# Patient Record
Sex: MALE | Race: WHITE | NOT HISPANIC OR LATINO | Employment: OTHER | URBAN - METROPOLITAN AREA
[De-identification: names, ages, dates, MRNs, and addresses within clinical notes are randomized per-mention and may not be internally consistent; named-entity substitution may affect disease eponyms.]

---

## 2020-01-15 ENCOUNTER — OFFICE VISIT (OUTPATIENT)
Dept: FAMILY MEDICINE CLINIC | Facility: CLINIC | Age: 65
End: 2020-01-15

## 2020-01-15 VITALS
HEART RATE: 74 BPM | BODY MASS INDEX: 32.99 KG/M2 | DIASTOLIC BLOOD PRESSURE: 88 MMHG | HEIGHT: 65 IN | WEIGHT: 198 LBS | OXYGEN SATURATION: 99 % | TEMPERATURE: 98.6 F | SYSTOLIC BLOOD PRESSURE: 130 MMHG

## 2020-01-15 DIAGNOSIS — I10 ESSENTIAL HYPERTENSION: Primary | ICD-10-CM

## 2020-01-15 DIAGNOSIS — E78.5 HYPERLIPIDEMIA, UNSPECIFIED HYPERLIPIDEMIA TYPE: ICD-10-CM

## 2020-01-15 DIAGNOSIS — R53.83 OTHER FATIGUE: ICD-10-CM

## 2020-01-15 DIAGNOSIS — N52.9 ERECTILE DYSFUNCTION, UNSPECIFIED ERECTILE DYSFUNCTION TYPE: ICD-10-CM

## 2020-01-15 PROCEDURE — 99203 OFFICE O/P NEW LOW 30 MIN: CPT | Performed by: FAMILY MEDICINE

## 2020-01-15 RX ORDER — CITALOPRAM 10 MG/1
TABLET ORAL
COMMUNITY
Start: 2019-12-11 | End: 2020-03-17 | Stop reason: SDUPTHER

## 2020-01-15 RX ORDER — LISINOPRIL 40 MG/1
TABLET ORAL
COMMUNITY
Start: 2019-10-25 | End: 2020-10-19 | Stop reason: SDUPTHER

## 2020-01-15 RX ORDER — AMLODIPINE BESYLATE 5 MG/1
TABLET ORAL
COMMUNITY
Start: 2020-01-04 | End: 2020-03-17 | Stop reason: SDUPTHER

## 2020-01-15 RX ORDER — ROSUVASTATIN CALCIUM 5 MG/1
TABLET, COATED ORAL EVERY OTHER DAY
COMMUNITY
Start: 2019-10-25 | End: 2020-10-19 | Stop reason: SDUPTHER

## 2020-01-15 RX ORDER — HYDROCHLOROTHIAZIDE 25 MG/1
TABLET ORAL
COMMUNITY
Start: 2019-12-11 | End: 2020-03-17 | Stop reason: SDUPTHER

## 2020-01-15 RX ORDER — FLUTICASONE PROPIONATE 50 MCG
SPRAY, SUSPENSION (ML) NASAL
COMMUNITY
Start: 2019-11-06 | End: 2020-01-15 | Stop reason: SDUPTHER

## 2020-01-15 RX ORDER — FLUTICASONE PROPIONATE 50 MCG
2 SPRAY, SUSPENSION (ML) NASAL
COMMUNITY
Start: 2017-06-26 | End: 2020-09-21 | Stop reason: SDUPTHER

## 2020-01-15 NOTE — PROGRESS NOTES
Subjective   Elkin Arias is a 64 y.o. male.     Pt is seen by Dr Thomas at [facility].  Pt would like to discuss medications.     Pt complains of keeping belly fat, sexual performance, low energy. Pt is concerned about possible low testosterone.     Erectile Dysfunction   This is a chronic problem. The current episode started more than 1 year ago. The problem has been waxing and waning since onset. The nature of his difficulty is achieving erection and maintaining erection. He reports no anxiety. Irritative symptoms do not include frequency or urgency. Obstructive symptoms do not include incomplete emptying, an intermittent stream, a slower stream, straining or a weak stream. Pertinent negatives include no chills, hematuria or inability to urinate. Nothing aggravates the symptoms. Past treatments include nothing. Risk factors include hypertension.        Pt. States that he would like to have testosterone checked, as he is having issues with ED, hot flashes, night time awakenings, and fatigue. He states that he was on prednisone for PMR for 3 years, however he has been off of the prednisone for 5 months.     Pt. States that he has a lot of arthritis and has had 2 knees replaced. He states that he is having an MRI next week for his right shoulder, which has been particularly painful.    Pt. Had a colonoscopy 2 years ago. Sister  from colon cancer.     Pt. Has had a flu shot.     The following portions of the patient's history were reviewed and updated as appropriate: allergies, current medications, past family history, past medical history, past social history, past surgical history and problem list.    Review of Systems   Constitutional: Negative for chills, fatigue, fever and unexpected weight change.   HENT: Negative for congestion, ear pain, nosebleeds, rhinorrhea, sinus pressure, sneezing, sore throat and trouble swallowing.    Eyes: Negative for itching and visual disturbance.   Respiratory:  "Negative for cough, chest tightness, shortness of breath and wheezing.    Cardiovascular: Negative for chest pain, palpitations and leg swelling.   Gastrointestinal: Negative for abdominal pain, anal bleeding, blood in stool, constipation, diarrhea, nausea and vomiting.   Endocrine: Negative for cold intolerance, heat intolerance, polydipsia and polyuria.   Genitourinary: Negative for difficulty urinating, frequency, hematuria, incomplete emptying and urgency.   Musculoskeletal: Positive for arthralgias, back pain and gait problem. Negative for myalgias.   Skin: Negative for rash and wound.   Neurological: Negative for dizziness, weakness, numbness and headaches.   Hematological: Negative for adenopathy. Does not bruise/bleed easily.   Psychiatric/Behavioral: Negative for agitation, confusion, decreased concentration and suicidal ideas. The patient is not nervous/anxious.        Objective     Vitals:    01/15/20 1337   BP: 130/88   Pulse: 74   Temp: 98.6 °F (37 °C)   SpO2: 99%   Weight: 89.8 kg (198 lb)   Height: 165.1 cm (65\")       Physical Exam   Constitutional: He is oriented to person, place, and time. He appears well-developed and well-nourished.   HENT:   Head: Normocephalic and atraumatic.   Eyes: Pupils are equal, round, and reactive to light. Conjunctivae and EOM are normal. Right eye exhibits no discharge. Left eye exhibits no discharge. No scleral icterus.   Neck: Normal range of motion. Neck supple. No JVD present. No tracheal deviation present. No thyromegaly present.   Cardiovascular: Normal rate, regular rhythm and normal heart sounds. Exam reveals no gallop and no friction rub.   No murmur heard.  Pulmonary/Chest: Effort normal and breath sounds normal. No respiratory distress. He has no wheezes. He has no rales. He exhibits no tenderness.   Abdominal: Soft. Bowel sounds are normal. He exhibits no distension and no mass. There is no tenderness.   Musculoskeletal: Normal range of motion. He exhibits " no edema.   Neurological: He is alert and oriented to person, place, and time. He has normal reflexes. He displays normal reflexes. No cranial nerve deficit. Coordination normal.   Skin: Skin is warm and dry.   Psychiatric: He has a normal mood and affect. His behavior is normal. Judgment and thought content normal.   Nursing note and vitals reviewed.      Assessment/Plan     Problem List Items Addressed This Visit        Cardiovascular and Mediastinum    Essential hypertension - Primary    Relevant Medications    amLODIPine (NORVASC) 5 MG tablet    hydroCHLOROthiazide (HYDRODIURIL) 25 MG tablet    lisinopril (PRINIVIL,ZESTRIL) 40 MG tablet    Hyperlipidemia    Relevant Medications    rosuvastatin (CRESTOR) 5 MG tablet       Other    Other fatigue    Relevant Orders    Hepatitis C Antibody    CBC & Differential    TSH    Comprehensive Metabolic Panel    Hemoglobin A1c    Lipid Panel    Vitamin D 25 Hydroxy    Vitamin B12    POC Urinalysis Dipstick, Automated    Testosterone    Erectile dysfunction    Relevant Orders    Ambulatory Referral to Urology        Pt. Due for shingles vaccination.   Pt. Has had a flu shot this year.  Will send for a copy of prior medical records.  Patient will sign a release as He is leaving the office today.  I will review those upon receipt.

## 2020-01-20 ENCOUNTER — LAB (OUTPATIENT)
Dept: FAMILY MEDICINE CLINIC | Facility: CLINIC | Age: 65
End: 2020-01-20

## 2020-01-20 DIAGNOSIS — R53.83 OTHER FATIGUE: ICD-10-CM

## 2020-01-20 LAB
25(OH)D3 SERPL-MCNC: 34.1 NG/ML (ref 30–100)
ALBUMIN SERPL-MCNC: 4.1 G/DL (ref 3.5–5.2)
ALBUMIN/GLOB SERPL: 1.4 G/DL
ALP SERPL-CCNC: 60 U/L (ref 39–117)
ALT SERPL W P-5'-P-CCNC: 24 U/L (ref 1–41)
ANION GAP SERPL CALCULATED.3IONS-SCNC: 14.8 MMOL/L (ref 5–15)
AST SERPL-CCNC: 19 U/L (ref 1–40)
BASOPHILS # BLD AUTO: 0.08 10*3/MM3 (ref 0–0.2)
BASOPHILS NFR BLD AUTO: 0.9 % (ref 0–1.5)
BILIRUB SERPL-MCNC: 0.4 MG/DL (ref 0.2–1.2)
BUN BLD-MCNC: 18 MG/DL (ref 8–23)
BUN/CREAT SERPL: 21.4 (ref 7–25)
CALCIUM SPEC-SCNC: 9.5 MG/DL (ref 8.6–10.5)
CHLORIDE SERPL-SCNC: 102 MMOL/L (ref 98–107)
CHOLEST SERPL-MCNC: 142 MG/DL (ref 0–200)
CO2 SERPL-SCNC: 25.2 MMOL/L (ref 22–29)
CREAT BLD-MCNC: 0.84 MG/DL (ref 0.76–1.27)
DEPRECATED RDW RBC AUTO: 40.2 FL (ref 37–54)
EOSINOPHIL # BLD AUTO: 0.18 10*3/MM3 (ref 0–0.4)
EOSINOPHIL NFR BLD AUTO: 2.1 % (ref 0.3–6.2)
ERYTHROCYTE [DISTWIDTH] IN BLOOD BY AUTOMATED COUNT: 13.1 % (ref 12.3–15.4)
GFR SERPL CREATININE-BSD FRML MDRD: 92 ML/MIN/1.73
GLOBULIN UR ELPH-MCNC: 2.9 GM/DL
GLUCOSE BLD-MCNC: 99 MG/DL (ref 65–99)
HBA1C MFR BLD: 6.3 % (ref 4.8–5.6)
HCT VFR BLD AUTO: 40.7 % (ref 37.5–51)
HCV AB SER DONR QL: NORMAL
HDLC SERPL-MCNC: 44 MG/DL (ref 40–60)
HGB BLD-MCNC: 13.7 G/DL (ref 13–17.7)
IMM GRANULOCYTES # BLD AUTO: 0.02 10*3/MM3 (ref 0–0.05)
IMM GRANULOCYTES NFR BLD AUTO: 0.2 % (ref 0–0.5)
LDLC SERPL CALC-MCNC: 78 MG/DL (ref 0–100)
LDLC/HDLC SERPL: 1.77 {RATIO}
LYMPHOCYTES # BLD AUTO: 2.46 10*3/MM3 (ref 0.7–3.1)
LYMPHOCYTES NFR BLD AUTO: 28.7 % (ref 19.6–45.3)
MCH RBC QN AUTO: 28.5 PG (ref 26.6–33)
MCHC RBC AUTO-ENTMCNC: 33.7 G/DL (ref 31.5–35.7)
MCV RBC AUTO: 84.6 FL (ref 79–97)
MONOCYTES # BLD AUTO: 0.85 10*3/MM3 (ref 0.1–0.9)
MONOCYTES NFR BLD AUTO: 9.9 % (ref 5–12)
NEUTROPHILS # BLD AUTO: 4.98 10*3/MM3 (ref 1.7–7)
NEUTROPHILS NFR BLD AUTO: 58.2 % (ref 42.7–76)
NRBC BLD AUTO-RTO: 0 /100 WBC (ref 0–0.2)
PLATELET # BLD AUTO: 285 10*3/MM3 (ref 140–450)
PMV BLD AUTO: 11.2 FL (ref 6–12)
POTASSIUM BLD-SCNC: 4.6 MMOL/L (ref 3.5–5.2)
PROT SERPL-MCNC: 7 G/DL (ref 6–8.5)
RBC # BLD AUTO: 4.81 10*6/MM3 (ref 4.14–5.8)
SODIUM BLD-SCNC: 142 MMOL/L (ref 136–145)
TESTOST SERPL-MCNC: 340 NG/DL (ref 193–740)
TRIGL SERPL-MCNC: 101 MG/DL (ref 0–150)
TSH SERPL DL<=0.05 MIU/L-ACNC: 2.78 UIU/ML (ref 0.27–4.2)
VIT B12 BLD-MCNC: 402 PG/ML (ref 211–946)
VLDLC SERPL-MCNC: 20.2 MG/DL (ref 5–40)
WBC NRBC COR # BLD: 8.57 10*3/MM3 (ref 3.4–10.8)

## 2020-01-20 PROCEDURE — 86803 HEPATITIS C AB TEST: CPT | Performed by: FAMILY MEDICINE

## 2020-01-20 PROCEDURE — 80053 COMPREHEN METABOLIC PANEL: CPT | Performed by: FAMILY MEDICINE

## 2020-01-20 PROCEDURE — 85025 COMPLETE CBC W/AUTO DIFF WBC: CPT | Performed by: FAMILY MEDICINE

## 2020-01-20 PROCEDURE — 80061 LIPID PANEL: CPT | Performed by: FAMILY MEDICINE

## 2020-01-20 PROCEDURE — 82306 VITAMIN D 25 HYDROXY: CPT | Performed by: FAMILY MEDICINE

## 2020-01-20 PROCEDURE — 84443 ASSAY THYROID STIM HORMONE: CPT | Performed by: FAMILY MEDICINE

## 2020-01-20 PROCEDURE — 84403 ASSAY OF TOTAL TESTOSTERONE: CPT | Performed by: FAMILY MEDICINE

## 2020-01-20 PROCEDURE — 83036 HEMOGLOBIN GLYCOSYLATED A1C: CPT | Performed by: FAMILY MEDICINE

## 2020-01-20 PROCEDURE — 82607 VITAMIN B-12: CPT | Performed by: FAMILY MEDICINE

## 2020-03-17 ENCOUNTER — TELEPHONE (OUTPATIENT)
Dept: FAMILY MEDICINE CLINIC | Facility: CLINIC | Age: 65
End: 2020-03-17

## 2020-03-17 RX ORDER — AMLODIPINE BESYLATE 5 MG/1
5 TABLET ORAL DAILY
Qty: 90 TABLET | Refills: 0 | Status: SHIPPED | OUTPATIENT
Start: 2020-03-17 | End: 2020-07-15

## 2020-03-17 RX ORDER — CITALOPRAM 10 MG/1
10 TABLET ORAL DAILY
Qty: 90 TABLET | Refills: 0 | Status: SHIPPED | OUTPATIENT
Start: 2020-03-17 | End: 2020-06-10

## 2020-03-17 RX ORDER — HYDROCHLOROTHIAZIDE 25 MG/1
25 TABLET ORAL DAILY
Qty: 90 TABLET | Refills: 1 | Status: SHIPPED | OUTPATIENT
Start: 2020-03-17 | End: 2020-09-04

## 2020-04-06 ENCOUNTER — TELEMEDICINE (OUTPATIENT)
Dept: FAMILY MEDICINE CLINIC | Facility: TELEHEALTH | Age: 65
End: 2020-04-06

## 2020-04-06 DIAGNOSIS — H92.03 EARACHE SYMPTOMS, BILATERAL: Primary | ICD-10-CM

## 2020-04-06 PROCEDURE — 99213 OFFICE O/P EST LOW 20 MIN: CPT | Performed by: NURSE PRACTITIONER

## 2020-04-06 RX ORDER — LORATADINE 10 MG/1
10 TABLET ORAL DAILY
Qty: 30 TABLET | Refills: 2 | Status: SHIPPED | OUTPATIENT
Start: 2020-04-06 | End: 2020-09-21 | Stop reason: SDUPTHER

## 2020-04-06 RX ORDER — AMOXICILLIN AND CLAVULANATE POTASSIUM 875; 125 MG/1; MG/1
1 TABLET, FILM COATED ORAL 2 TIMES DAILY
Qty: 14 TABLET | Refills: 0 | Status: SHIPPED | OUTPATIENT
Start: 2020-04-06 | End: 2020-04-13

## 2020-04-06 NOTE — PATIENT INSTRUCTIONS
-Continue Flonase and Claritin  -Take medications as prescribed  -If symptoms worsen to do not improve, follow up with primary doctor or visit nearest urgent care      Otitis Media, Adult    Otitis media occurs when there is inflammation and fluid in the middle ear. Your middle ear is a part of the ear that contains bones for hearing as well as air that helps send sounds to your brain.  What are the causes?  This condition is caused by a blockage in the eustachian tube. This tube drains fluid from the ear to the back of the nose (nasopharynx). A blockage in this tube can be caused by an object or by swelling (edema) in the tube. Problems that can cause a blockage include:  · A cold or other upper respiratory infection.  · Allergies.  · An irritant, such as tobacco smoke.  · Enlarged adenoids. The adenoids are areas of soft tissue located high in the back of the throat, behind the nose and the roof of the mouth.  · A mass in the nasopharynx.  · Damage to the ear caused by pressure changes (barotrauma).  What are the signs or symptoms?  Symptoms of this condition include:  · Ear pain.  · A fever.  · Decreased hearing.  · A headache.  · Tiredness (lethargy).  · Fluid leaking from the ear.  · Ringing in the ear.  How is this diagnosed?  This condition is diagnosed with a physical exam. During the exam your health care provider will use an instrument called an otoscope to look into your ear and check for redness, swelling, and fluid. He or she will also ask about your symptoms.  Your health care provider may also order tests, such as:  · A test to check the movement of the eardrum (pneumatic otoscopy). This test is done by squeezing a small amount of air into the ear.  · A test that changes air pressure in the middle ear to check how well the eardrum moves and whether the eustachian tube is working (tympanogram).  How is this treated?  This condition usually goes away on its own within 3-5 days. But if the condition is  caused by a bacteria infection and does not go away own its own, or keeps coming back, your health care provider may:  · Prescribe antibiotic medicines to treat the infection.  · Prescribe or recommend medicines to control pain.  Follow these instructions at home:  · Take over-the-counter and prescription medicines only as told by your health care provider.  · If you were prescribed an antibiotic medicine, take it as told by your health care provider. Do not stop taking the antibiotic even if you start to feel better.  · Keep all follow-up visits as told by your health care provider. This is important.  Contact a health care provider if:  · You have bleeding from your nose.  · There is a lump on your neck.  · You are not getting better in 5 days.  · You feel worse instead of better.  Get help right away if:  · You have severe pain that is not controlled with medicine.  · You have swelling, redness, or pain around your ear.  · You have stiffness in your neck.  · A part of your face is paralyzed.  · The bone behind your ear (mastoid) is tender when you touch it.  · You develop a severe headache.  Summary  · Otitis media is redness, soreness, and swelling of the middle ear.  · This condition usually goes away on its own within 3-5 days.  · If the problem does not go away in 3-5 days, your health care provider may prescribe or recommend medicines to treat your symptoms.  · If you were prescribed an antibiotic medicine, take it as told by your health care provider.  This information is not intended to replace advice given to you by your health care provider. Make sure you discuss any questions you have with your health care provider.  Document Released: 09/22/2005 Document Revised: 12/08/2017 Document Reviewed: 12/08/2017  [employer]vier Interactive Patient Education © 2020 [employer]  Eustachian Tube Dysfunction    Eustachian tube dysfunction refers to a condition in which a blockage develops in the narrow passage that  connects the middle ear to the back of the nose (eustachian tube). The eustachian tube regulates air pressure in the middle ear by letting air move between the ear and nose. It also helps to drain fluid from the middle ear space.  Eustachian tube dysfunction can affect one or both ears. When the eustachian tube does not function properly, air pressure, fluid, or both can build up in the middle ear.  What are the causes?  This condition occurs when the eustachian tube becomes blocked or cannot open normally. Common causes of this condition include:  · Ear infections.  · Colds and other infections that affect the nose, mouth, and throat (upper respiratory tract).  · Allergies.  · Irritation from cigarette smoke.  · Irritation from stomach acid coming up into the esophagus (gastroesophageal reflux). The esophagus is the tube that carries food from the mouth to the stomach.  · Sudden changes in air pressure, such as from descending in an airplane or scuba diving.  · Abnormal growths in the nose or throat, such as:  ? Growths that line the nose (nasal polyps).  ? Abnormal growth of cells (tumors).  ? Enlarged tissue at the back of the throat (adenoids).  What increases the risk?  You are more likely to develop this condition if:  · You smoke.  · You are overweight.  · You are a child who has:  ? Certain birth defects of the mouth, such as cleft palate.  ? Large tonsils or adenoids.  What are the signs or symptoms?  Common symptoms of this condition include:  · A feeling of fullness in the ear.  · Ear pain.  · Clicking or popping noises in the ear.  · Ringing in the ear.  · Hearing loss.  · Loss of balance.  · Dizziness.  Symptoms may get worse when the air pressure around you changes, such as when you travel to an area of high elevation, fly on an airplane, or go scuba diving.  How is this diagnosed?  This condition may be diagnosed based on:  · Your symptoms.  · A physical exam of your ears, nose, and throat.  · Tests,  "such as those that measure:  ? The movement of your eardrum (tympanogram).  ? Your hearing (audiometry).  How is this treated?  Treatment depends on the cause and severity of your condition.  · In mild cases, you may relieve your symptoms by moving air into your ears. This is called \"popping the ears.\"  · In more severe cases, or if you have symptoms of fluid in your ears, treatment may include:  ? Medicines to relieve congestion (decongestants).  ? Medicines that treat allergies (antihistamines).  ? Nasal sprays or ear drops that contain medicines that reduce swelling (steroids).  ? A procedure to drain the fluid in your eardrum (myringotomy). In this procedure, a small tube is placed in the eardrum to:  § Drain the fluid.  § Restore the air in the middle ear space.  ? A procedure to insert a balloon device through the nose to inflate the opening of the eustachian tube (balloon dilation).  Follow these instructions at home:  Lifestyle  · Do not do any of the following until your health care provider approves:  ? Travel to high altitudes.  ? Fly in airplanes.  ? Work in a pressurized cabin or room.  ? Scuba dive.  · Do not use any products that contain nicotine or tobacco, such as cigarettes and e-cigarettes. If you need help quitting, ask your health care provider.  · Keep your ears dry. Wear fitted earplugs during showering and bathing. Dry your ears completely after.  General instructions  · Take over-the-counter and prescription medicines only as told by your health care provider.  · Use techniques to help pop your ears as recommended by your health care provider. These may include:  ? Chewing gum.  ? Yawning.  ? Frequent, forceful swallowing.  ? Closing your mouth, holding your nose closed, and gently blowing as if you are trying to blow air out of your nose.  · Keep all follow-up visits as told by your health care provider. This is important.  Contact a health care provider if:  · Your symptoms do not go away " after treatment.  · Your symptoms come back after treatment.  · You are unable to pop your ears.  · You have:  ? A fever.  ? Pain in your ear.  ? Pain in your head or neck.  ? Fluid draining from your ear.  · Your hearing suddenly changes.  · You become very dizzy.  · You lose your balance.  Summary  · Eustachian tube dysfunction refers to a condition in which a blockage develops in the eustachian tube.  · It can be caused by ear infections, allergies, inhaled irritants, or abnormal growths in the nose or throat.  · Symptoms include ear pain, hearing loss, or ringing in the ears.  · Mild cases are treated with maneuvers to unblock the ears, such as yawning or ear popping.  · Severe cases are treated with medicines. Surgery may also be done (rare).  This information is not intended to replace advice given to you by your health care provider. Make sure you discuss any questions you have with your health care provider.  Document Released: 01/13/2017 Document Revised: 04/09/2019 Document Reviewed: 04/09/2019  [employer] Interactive Patient Education © 2020 [employer]

## 2020-06-10 RX ORDER — CITALOPRAM 10 MG/1
TABLET ORAL
Qty: 90 TABLET | Refills: 0 | Status: SHIPPED | OUTPATIENT
Start: 2020-06-10 | End: 2020-09-04

## 2020-07-15 RX ORDER — AMLODIPINE BESYLATE 5 MG/1
TABLET ORAL
Qty: 30 TABLET | Refills: 0 | Status: SHIPPED | OUTPATIENT
Start: 2020-07-15 | End: 2020-08-11

## 2020-08-11 RX ORDER — AMLODIPINE BESYLATE 5 MG/1
TABLET ORAL
Qty: 30 TABLET | Refills: 11 | Status: SHIPPED | OUTPATIENT
Start: 2020-08-11 | End: 2020-10-19 | Stop reason: SDUPTHER

## 2020-09-04 RX ORDER — CITALOPRAM 10 MG/1
10 TABLET ORAL DAILY
Qty: 30 TABLET | Refills: 0 | Status: SHIPPED | OUTPATIENT
Start: 2020-09-04 | End: 2020-10-05

## 2020-09-04 RX ORDER — HYDROCHLOROTHIAZIDE 25 MG/1
25 TABLET ORAL DAILY
Qty: 30 TABLET | Refills: 0 | Status: SHIPPED | OUTPATIENT
Start: 2020-09-04 | End: 2020-10-19 | Stop reason: SDUPTHER

## 2020-09-18 ENCOUNTER — TELEPHONE (OUTPATIENT)
Dept: FAMILY MEDICINE CLINIC | Facility: CLINIC | Age: 65
End: 2020-09-18

## 2020-09-18 DIAGNOSIS — H92.03 EARACHE SYMPTOMS, BILATERAL: ICD-10-CM

## 2020-09-18 RX ORDER — FLUTICASONE PROPIONATE 50 MCG
2 SPRAY, SUSPENSION (ML) NASAL DAILY
Qty: 16 G | Refills: 2 | Status: CANCELLED | OUTPATIENT
Start: 2020-09-18

## 2020-09-18 RX ORDER — LORATADINE 10 MG/1
10 TABLET ORAL DAILY
Qty: 30 TABLET | Refills: 2 | Status: CANCELLED | OUTPATIENT
Start: 2020-09-18

## 2020-09-21 DIAGNOSIS — J30.1 SEASONAL ALLERGIC RHINITIS DUE TO POLLEN: Primary | ICD-10-CM

## 2020-09-21 DIAGNOSIS — H92.03 EARACHE SYMPTOMS, BILATERAL: ICD-10-CM

## 2020-09-21 RX ORDER — FLUTICASONE PROPIONATE 50 MCG
2 SPRAY, SUSPENSION (ML) NASAL DAILY
Qty: 18.2 ML | Refills: 11 | Status: SHIPPED | OUTPATIENT
Start: 2020-09-21 | End: 2020-10-19 | Stop reason: SDUPTHER

## 2020-09-21 RX ORDER — LORATADINE 10 MG/1
10 TABLET ORAL DAILY
Qty: 90 TABLET | Refills: 3 | Status: SHIPPED | OUTPATIENT
Start: 2020-09-21 | End: 2020-10-19 | Stop reason: SDUPTHER

## 2020-10-05 RX ORDER — CITALOPRAM 10 MG/1
TABLET ORAL
Qty: 30 TABLET | Refills: 11 | Status: SHIPPED | OUTPATIENT
Start: 2020-10-05 | End: 2020-10-19 | Stop reason: SDUPTHER

## 2020-10-19 ENCOUNTER — OFFICE VISIT (OUTPATIENT)
Dept: FAMILY MEDICINE CLINIC | Facility: CLINIC | Age: 65
End: 2020-10-19

## 2020-10-19 VITALS
BODY MASS INDEX: 33.85 KG/M2 | WEIGHT: 203.2 LBS | OXYGEN SATURATION: 98 % | TEMPERATURE: 98.7 F | DIASTOLIC BLOOD PRESSURE: 82 MMHG | HEIGHT: 65 IN | HEART RATE: 72 BPM | SYSTOLIC BLOOD PRESSURE: 144 MMHG

## 2020-10-19 DIAGNOSIS — F41.9 ANXIETY: ICD-10-CM

## 2020-10-19 DIAGNOSIS — Z23 NEEDS FLU SHOT: ICD-10-CM

## 2020-10-19 DIAGNOSIS — Z00.00 ENCOUNTER FOR MEDICARE ANNUAL WELLNESS EXAM: Primary | ICD-10-CM

## 2020-10-19 DIAGNOSIS — I10 ESSENTIAL HYPERTENSION: ICD-10-CM

## 2020-10-19 DIAGNOSIS — R73.09 ELEVATED HEMOGLOBIN A1C MEASUREMENT: ICD-10-CM

## 2020-10-19 DIAGNOSIS — H92.03 EARACHE SYMPTOMS, BILATERAL: ICD-10-CM

## 2020-10-19 DIAGNOSIS — J30.1 SEASONAL ALLERGIC RHINITIS DUE TO POLLEN: ICD-10-CM

## 2020-10-19 LAB
EXPIRATION DATE: NORMAL
HBA1C MFR BLD: 5.7 %
Lab: NORMAL

## 2020-10-19 PROCEDURE — G0008 ADMIN INFLUENZA VIRUS VAC: HCPCS | Performed by: FAMILY MEDICINE

## 2020-10-19 PROCEDURE — 90686 IIV4 VACC NO PRSV 0.5 ML IM: CPT | Performed by: FAMILY MEDICINE

## 2020-10-19 PROCEDURE — 83036 HEMOGLOBIN GLYCOSYLATED A1C: CPT | Performed by: FAMILY MEDICINE

## 2020-10-19 PROCEDURE — G0439 PPPS, SUBSEQ VISIT: HCPCS | Performed by: FAMILY MEDICINE

## 2020-10-19 RX ORDER — LISINOPRIL 40 MG/1
40 TABLET ORAL DAILY
Qty: 90 TABLET | Refills: 3 | Status: SHIPPED | OUTPATIENT
Start: 2020-10-19 | End: 2021-10-18

## 2020-10-19 RX ORDER — HYDROCHLOROTHIAZIDE 25 MG/1
25 TABLET ORAL DAILY
Qty: 90 TABLET | Refills: 3 | Status: SHIPPED | OUTPATIENT
Start: 2020-10-19 | End: 2021-10-18

## 2020-10-19 RX ORDER — ROSUVASTATIN CALCIUM 5 MG/1
5 TABLET, COATED ORAL DAILY
Qty: 90 TABLET | Refills: 3 | Status: SHIPPED | OUTPATIENT
Start: 2020-10-19 | End: 2021-10-27 | Stop reason: SDUPTHER

## 2020-10-19 RX ORDER — FLUTICASONE PROPIONATE 50 MCG
2 SPRAY, SUSPENSION (ML) NASAL DAILY
Qty: 18.2 ML | Refills: 11 | Status: SHIPPED | OUTPATIENT
Start: 2020-10-19 | End: 2021-07-26 | Stop reason: SDUPTHER

## 2020-10-19 RX ORDER — AMLODIPINE BESYLATE 5 MG/1
5 TABLET ORAL DAILY
Qty: 90 TABLET | Refills: 3 | Status: SHIPPED | OUTPATIENT
Start: 2020-10-19 | End: 2021-10-18

## 2020-10-19 RX ORDER — CITALOPRAM 10 MG/1
10 TABLET ORAL DAILY
Qty: 90 TABLET | Refills: 3 | Status: SHIPPED | OUTPATIENT
Start: 2020-10-19 | End: 2020-11-03 | Stop reason: SDUPTHER

## 2020-10-19 RX ORDER — LORATADINE 10 MG/1
10 TABLET ORAL DAILY
Qty: 90 TABLET | Refills: 3 | Status: SHIPPED | OUTPATIENT
Start: 2020-10-19 | End: 2021-07-26

## 2020-10-19 NOTE — PROGRESS NOTES
The ABCs of the Annual Wellness Visit  Subsequent Medicare Wellness Visit    Chief Complaint   Patient presents with   • Welcome To Medicare     changed pharmacy and needs a list of meds faxed       Subjective   History of Present Illness:  Elkin Arias is a 64 y.o. male who presents for a Subsequent Medicare Wellness Visit.    HEALTH RISK ASSESSMENT    Recent Hospitalizations:  No hospitalization(s) within the last year.    Current Medical Providers:  Patient Care Team:  Sho Kim MD as PCP - General (Family Medicine)    Smoking Status:  Social History     Tobacco Use   Smoking Status Former Smoker   Smokeless Tobacco Never Used   Tobacco Comment    some in the 70s       Alcohol Consumption:  Social History     Substance and Sexual Activity   Alcohol Use Yes    Comment: 1 beer a month on average       Depression Screen:   PHQ-2/PHQ-9 Depression Screening 10/19/2020   Little interest or pleasure in doing things 0   Feeling down, depressed, or hopeless 0   Total Score 0       Fall Risk Screen:  TIGREADI Fall Risk Assessment has not been completed.    Health Habits and Functional and Cognitive Screening:  Functional & Cognitive Status 10/19/2020   Do you have difficulty preparing food and eating? No   Do you have difficulty bathing yourself, getting dressed or grooming yourself? No   Do you have difficulty using the toilet? No   Do you have difficulty moving around from place to place? No   Do you have trouble with steps or getting out of a bed or a chair? No   Current Diet Well Balanced Diet   Dental Exam Up to date   Eye Exam Up to date   Exercise (times per week) 7 times per week   Current Exercise Activities Include Cardiovasular Workout on Exercise Equipment   Do you need help using the phone?  No   Are you deaf or do you have serious difficulty hearing?  No   Do you need help with transportation? No   Do you need help shopping? No   Do you need help preparing meals?  No   Do you need help with housework?  No    Do you need help with laundry? No   Do you need help taking your medications? No   Do you need help managing money? No   Do you ever drive or ride in a car without wearing a seat belt? Yes   Have you felt unusual stress, anger or loneliness in the last month? No   Who do you live with? Spouse   If you need help, do you have trouble finding someone available to you? No   Have you been bothered in the last four weeks by sexual problems? No   Do you have difficulty concentrating, remembering or making decisions? No         Does the patient have evidence of cognitive impairment? No    Asprin use counseling:Taking ASA appropriately as indicated    Age-appropriate Screening Schedule:  Refer to the list below for future screening recommendations based on patient's age, sex and/or medical conditions. Orders for these recommended tests are listed in the plan section. The patient has been provided with a written plan.    Health Maintenance   Topic Date Due   • ZOSTER VACCINE (2 of 3) 01/23/2021 (Originally 11/22/2016)   • LIPID PANEL  01/20/2021   • COLONOSCOPY  01/16/2023   • TDAP/TD VACCINES (3 - Td) 01/01/2028   • INFLUENZA VACCINE  Completed          The following portions of the patient's history were reviewed and updated as appropriate: allergies, current medications, past family history, past medical history, past social history, past surgical history and problem list.    Outpatient Medications Prior to Visit   Medication Sig Dispense Refill   • amLODIPine (NORVASC) 5 MG tablet TAKE 1 TABLET DAILY (WILL NEED APPOINTMENT FOR FURTHER REFILLS) 30 tablet 11   • citalopram (CeleXA) 10 MG tablet TAKE 1 TABLET DAILY (MUST MAKE APPOINTMENT FOR FURTHER REFILLS) 30 tablet 11   • fluticasone (FLONASE) 50 MCG/ACT nasal spray 2 sprays into the nostril(s) as directed by provider Daily. 18.2 mL 11   • hydroCHLOROthiazide (HYDRODIURIL) 25 MG tablet Take 1 tablet by mouth Daily. (MUST MAKE APPT FOR FURTHER REFILLS) 30 tablet 0   •  lisinopril (PRINIVIL,ZESTRIL) 40 MG tablet      • loratadine (Claritin) 10 MG tablet Take 1 tablet by mouth Daily. 90 tablet 3   • rosuvastatin (CRESTOR) 5 MG tablet Every Other Day.       No facility-administered medications prior to visit.        Patient Active Problem List   Diagnosis   • Essential hypertension   • Hyperlipidemia   • Other fatigue   • Erectile dysfunction       Advanced Care Planning:  ACP discussion was held with the patient during this visit. Patient does not have an advance directive, information provided.    Review of Systems   Constitutional: Negative for chills, fatigue, fever and unexpected weight change.   HENT: Negative for congestion, ear pain, nosebleeds, rhinorrhea, sinus pressure, sneezing, sore throat and trouble swallowing.    Eyes: Negative for itching and visual disturbance.   Respiratory: Negative for cough, chest tightness, shortness of breath and wheezing.    Cardiovascular: Negative for chest pain, palpitations and leg swelling.   Gastrointestinal: Negative for abdominal pain, anal bleeding, blood in stool, constipation, diarrhea, nausea and vomiting.   Endocrine: Negative for cold intolerance, heat intolerance, polydipsia and polyuria.   Genitourinary: Negative for difficulty urinating, frequency, hematuria and urgency.   Musculoskeletal: Negative for back pain, gait problem and myalgias.   Skin: Negative for rash and wound.   Neurological: Negative for dizziness, weakness, numbness and headaches.   Hematological: Negative for adenopathy. Does not bruise/bleed easily.   Psychiatric/Behavioral: Negative for agitation, confusion, decreased concentration and suicidal ideas. The patient is not nervous/anxious.        Compared to one year ago, the patient feels his physical health is better.  Compared to one year ago, the patient feels his mental health is better.    Reviewed chart for potential of high risk medication in the elderly: yes  Reviewed chart for potential of harmful  "drug interactions in the elderly:yes    Objective         Vitals:    10/19/20 1418   BP: 144/82   Pulse: 72   Temp: 98.7 °F (37.1 °C)   SpO2: 98%   Weight: 92.2 kg (203 lb 3.2 oz)   Height: 165.1 cm (65\")   PainSc: 0-No pain       Body mass index is 33.81 kg/m².  Discussed the patient's BMI with him. The BMI is above average; no BMI management plan is appropriate..    Physical Exam  Vitals signs and nursing note reviewed.   Constitutional:       Appearance: He is well-developed.   HENT:      Head: Normocephalic and atraumatic.   Eyes:      General: No scleral icterus.        Right eye: No discharge.         Left eye: No discharge.      Conjunctiva/sclera: Conjunctivae normal.      Pupils: Pupils are equal, round, and reactive to light.   Neck:      Musculoskeletal: Normal range of motion and neck supple.      Thyroid: No thyromegaly.      Vascular: No JVD.      Trachea: No tracheal deviation.   Cardiovascular:      Rate and Rhythm: Normal rate and regular rhythm.      Heart sounds: Normal heart sounds. No murmur. No friction rub. No gallop.    Pulmonary:      Effort: Pulmonary effort is normal. No respiratory distress.      Breath sounds: Normal breath sounds. No wheezing or rales.   Chest:      Chest wall: No tenderness.   Abdominal:      General: Bowel sounds are normal. There is no distension.      Palpations: Abdomen is soft. There is no mass.      Tenderness: There is no abdominal tenderness.   Musculoskeletal: Normal range of motion.   Skin:     General: Skin is warm and dry.   Neurological:      Mental Status: He is alert and oriented to person, place, and time.      Cranial Nerves: No cranial nerve deficit.      Coordination: Coordination normal.      Deep Tendon Reflexes: Reflexes are normal and symmetric. Reflexes normal.   Psychiatric:         Behavior: Behavior normal.         Thought Content: Thought content normal.         Judgment: Judgment normal.         Lab Results   Component Value Date    HGBA1C " 5.7 10/19/2020        Assessment/Plan   Medicare Risks and Personalized Health Plan  CMS Preventative Services Quick Reference  Advance Directive Discussion  Cardiovascular risk  Chronic Pain   Colon Cancer Screening  Depression/Dysphoria  Diabetic Lab Screening   Fall Risk  Obesity/Overweight   Polypharmacy    The above risks/problems have been discussed with the patient.  Pertinent information has been shared with the patient in the After Visit Summary.  Follow up plans and orders are seen below in the Assessment/Plan Section.    Diagnoses and all orders for this visit:    1. Encounter for Medicare annual wellness exam (Primary)  -     rosuvastatin (CRESTOR) 5 MG tablet; Take 1 tablet by mouth Daily.  Dispense: 90 tablet; Refill: 3    2. Seasonal allergic rhinitis due to pollen  -     fluticasone (FLONASE) 50 MCG/ACT nasal spray; 2 sprays into the nostril(s) as directed by provider Daily.  Dispense: 18.2 mL; Refill: 11    3. Earache symptoms, bilateral  -     loratadine (Claritin) 10 MG tablet; Take 1 tablet by mouth Daily.  Dispense: 90 tablet; Refill: 3    4. Essential hypertension  -     amLODIPine (NORVASC) 5 MG tablet; Take 1 tablet by mouth Daily.  Dispense: 90 tablet; Refill: 3  -     hydroCHLOROthiazide (HYDRODIURIL) 25 MG tablet; Take 1 tablet by mouth Daily.  Dispense: 90 tablet; Refill: 3  -     lisinopril (PRINIVIL,ZESTRIL) 40 MG tablet; Take 1 tablet by mouth Daily.  Dispense: 90 tablet; Refill: 3    5. Anxiety  -     citalopram (CeleXA) 10 MG tablet; Take 1 tablet by mouth Daily.  Dispense: 90 tablet; Refill: 3    6. Elevated hemoglobin A1c measurement  -     POC Glycosylated Hemoglobin (Hb A1C)    7. Needs flu shot  -     Fluarix Quad >6 Months ([id number])      Follow Up:  Return in about 6 months (around 4/19/2021) for Recheck.     An After Visit Summary and PPPS were given to the patient.

## 2020-10-19 NOTE — PATIENT INSTRUCTIONS
Advance Directive    Advance directives are legal documents that let you make choices ahead of time about your health care and medical treatment in case you become unable to communicate for yourself. Advance directives are a way for you to make known your wishes to family, friends, and health care providers. This can let others know about your end-of-life care if you become unable to communicate.  Discussing and writing advance directives should happen over time rather than all at once. Advance directives can be changed depending on your situation and what you want, even after you have signed the advance directives.  There are different types of advance directives, such as:  · Medical power of .  · Living will.  · Do not resuscitate (DNR) or do not attempt resuscitation (DNAR) order.  Health care proxy and medical power of   A health care proxy is also called a health care agent. This is a person who is appointed to make medical decisions for you in cases where you are unable to make the decisions yourself. Generally, people choose someone they know well and trust to represent their preferences. Make sure to ask this person for an agreement to act as your proxy. A proxy may have to exercise judgment in the event of a medical decision for which your wishes are not known.  A medical power of  is a legal document that names your health care proxy. Depending on the laws in your state, after the document is written, it may also need to be:  · Signed.  · Notarized.  · Dated.  · Copied.  · Witnessed.  · Incorporated into your medical record.  You may also want to appoint someone to manage your money in a situation in which you are unable to do so. This is called a durable power of  for finances. It is a separate legal document from the durable power of  for health care. You may choose the same person or someone different from your health care proxy to act as your agent in money  matters.  If you do not appoint a proxy, or if there is a concern that the proxy is not acting in your best interests, a court may appoint a guardian to act on your behalf.  Living will  A living will is a set of instructions that state your wishes about medical care when you cannot express them yourself. Health care providers should keep a copy of your living will in your medical record. You may want to give a copy to family members or friends. To alert caregivers in case of an emergency, you can place a card in your wallet to let them know that you have a living will and where they can find it. A living will is used if you become:  · Terminally ill.  · Disabled.  · Unable to communicate or make decisions.  Items to consider in your living will include:  · To use or not to use life-support equipment, such as dialysis machines and breathing machines (ventilators).  · A DNR or DNAR order. This tells health care providers not to use cardiopulmonary resuscitation (CPR) if breathing or heartbeat stops.  · To use or not to use tube feeding.  · To be given or not to be given food and fluids.  · Comfort (palliative) care when the goal becomes comfort rather than a cure.  · Donation of organs and tissues.  A living will does not give instructions for distributing your money and property if you should pass away.  DNR or DNAR  A DNR or DNAR order is a request not to have CPR in the event that your heart stops beating or you stop breathing. If a DNR or DNAR order has not been made and shared, a health care provider will try to help any patient whose heart has stopped or who has stopped breathing. If you plan to have surgery, talk with your health care provider about how your DNR or DNAR order will be followed if problems occur.  What if I do not have an advance directive?  If you do not have an advance directive, some states assign family decision makers to act on your behalf based on how closely you are related to them. Each  state has its own laws about advance directives. You may want to check with your health care provider, , or state representative about the laws in your state.  Summary  · Advance directives are the legal documents that allow you to make choices ahead of time about your health care and medical treatment in case you become unable to tell others about your care.  · The process of discussing and writing advance directives should happen over time. You can change the advance directives, even after you have signed them.  · Advance directives include DNR or DNAR orders, living guerrero, and designating an agent as your medical power of .  This information is not intended to replace advice given to you by your health care provider. Make sure you discuss any questions you have with your health care provider.  Document Released: 2009 Document Revised: 2020 Document Reviewed: 2020  [employer] Patient Education ©  [employer]    Medicare Wellness  Personal Prevention Plan of Service     Date of Office Visit:  10/19/2020  Encounter Provider:  Sho Kim MD  Place of Service:  [facility]  Patient Name: Elkin Arias  :  1955    As part of the Medicare Wellness portion of your visit today, we are providing you with this personalized preventive plan of services (PPPS). This plan is based upon recommendations of the [place] Preventive Services Task Force (USPSTF) and the Advisory Committee on Immunization Practices (ACIP).    This lists the preventive care services that should be considered, and provides dates of when you are due. Items listed as completed are up-to-date and do not require any further intervention.    Health Maintenance   Topic Date Due   • ANNUAL WELLNESS VISIT  01/15/2020   • INFLUENZA VACCINE  2020   • ZOSTER VACCINE (2 of 3) 2021 (Originally 2016)   • LIPID PANEL  2021   • COLONOSCOPY  2023   •  TDAP/TD VACCINES (3 - Td) 01/01/2028   • HEPATITIS C SCREENING  Completed   • Pneumococcal Vaccine 0-64  Aged Out       No orders of the defined types were placed in this encounter.      No follow-ups on file.

## 2020-10-28 ENCOUNTER — TELEPHONE (OUTPATIENT)
Dept: FAMILY MEDICINE CLINIC | Facility: CLINIC | Age: 65
End: 2020-10-28

## 2020-10-28 NOTE — TELEPHONE ENCOUNTER
PT CALLED STATING THAT MEDICARE QUESTIONED citalopram (CeleXA) 10 MG tablet.  PT ASKED IF DR BLOUNT STILL WANTED HIM TO TAKE IT.  PT STATED THAT THEY WILL NOT APPROVE THE MEDICATION UNTIL THE PROVIDER APPROVES THIS.    PT CONTACT [phone]     PLEASE ADVISE    PHARMACY VERIFIED [facility] - [place], [place] - [address] - [phone]  - [phone] FX

## 2020-11-03 DIAGNOSIS — F41.9 ANXIETY: Primary | ICD-10-CM

## 2020-11-03 RX ORDER — CITALOPRAM 10 MG/1
10 TABLET ORAL DAILY
Qty: 90 TABLET | Refills: 3 | Status: SHIPPED | OUTPATIENT
Start: 2020-11-03 | End: 2021-10-18

## 2020-12-04 ENCOUNTER — TELEPHONE (OUTPATIENT)
Dept: FAMILY MEDICINE CLINIC | Facility: CLINIC | Age: 65
End: 2020-12-04

## 2020-12-04 NOTE — TELEPHONE ENCOUNTER
PT CALLED STATED THAT HE HAS A TOOTH ACH, WAS GIVEN ANTIBIOTIC YESTERDAY clindamycin RX, STATED  THAT HE HAS SORENESS BELOW EYES AND WOULD LIKE TO KNOW WHAT DR SUGGEST, ALSO GUMS ARE SORE.    PLEASE ADVISE.  CALL BACK:[phone]      [facility] [place] [place] [address]

## 2020-12-04 NOTE — TELEPHONE ENCOUNTER
Called patient regarding his appointment. He was started on the clindamycin yesterday afternoon. He has not been on it a 24 hours yet.   He denies fever. If any facial swelling it is minimal.   He has been taking ibuprofen for pain with some relief.   He stated he took a 3 hour nap and is feeling better.   Discussed following up tomorrow if worsening. He is agreeable. Discussed taking a photo every 12 hours to monitor for facial swelling.     He is agreeable to f/u in the morning.   BERTIN Palacios

## 2020-12-26 ENCOUNTER — TELEPHONE (OUTPATIENT)
Dept: FAMILY MEDICINE CLINIC | Facility: CLINIC | Age: 65
End: 2020-12-26

## 2020-12-26 NOTE — TELEPHONE ENCOUNTER
----- Message from Keyana Cottrell MA sent at 12/26/2020  9:37 AM EST -----  Regarding: FW: Test Results Question  Contact: [phone]  On call message  ----- Message -----  From: Elkin Arias  Sent: 12/24/2020  10:07 PM EST  To: [facility]  Subject: Test Results Question                            I have a question about SCANNED - LABS resulted on 12/21/20. I can't read them    I spoke to the patient on the telephone and told her that all of his labs were normal that were reported to us.  Apparently there were some antibody levels that he was expecting that were not included in these results.  He will contact ACL next week in that regard.

## 2020-12-30 ENCOUNTER — TELEMEDICINE (OUTPATIENT)
Dept: FAMILY MEDICINE CLINIC | Facility: CLINIC | Age: 65
End: 2020-12-30

## 2020-12-30 DIAGNOSIS — U07.1 COVID-19: ICD-10-CM

## 2020-12-30 DIAGNOSIS — J01.00 ACUTE MAXILLARY SINUSITIS, RECURRENCE NOT SPECIFIED: Primary | ICD-10-CM

## 2020-12-30 PROBLEM — M35.3 PMR (POLYMYALGIA RHEUMATICA) (HCC): Status: ACTIVE | Noted: 2020-12-30

## 2020-12-30 PROCEDURE — 99213 OFFICE O/P EST LOW 20 MIN: CPT | Performed by: NURSE PRACTITIONER

## 2020-12-30 RX ORDER — AZITHROMYCIN 250 MG/1
TABLET, FILM COATED ORAL
Qty: 6 TABLET | Refills: 0 | Status: SHIPPED | OUTPATIENT
Start: 2020-12-30 | End: 2021-07-26

## 2020-12-30 RX ORDER — PREDNISONE 1 MG/1
TABLET ORAL
COMMUNITY
Start: 2020-11-26 | End: 2021-07-26

## 2020-12-30 NOTE — PROGRESS NOTES
Linda Arias is a 65 y.o. male.     This was an audio and video enabled telemedicine encounter.      History of Present Illness Complains of sinus symptoms for 3 weeks. Had dental pain and was treated with Cleocin for probably abscess. He did complete one week of the Cleocin that started on 12/3/20.   He realized it was not a dental problem and felt like more of a sinus infection. The office felt like the Cleocin would be effective for sinus infection.  Currently complains of pressure around eyes. Has ear sensitivity. Scant post nasal drainage is light green. No fever. Extended family members with covid. Had a rapid test for Covid last week.12/26/20 that was positive.  Has a slight cough. No sore throat. No sob, chest pain. No loss of taste or smell.   He is taking zinc.   On steroid for autoimmune disorder.    Outpatient Encounter Medications as of 12/30/2020   Medication Sig Dispense Refill   • amLODIPine (NORVASC) 5 MG tablet Take 1 tablet by mouth Daily. 90 tablet 3   • citalopram (CeleXA) 10 MG tablet Take 1 tablet by mouth Daily. 90 tablet 3   • fluticasone (FLONASE) 50 MCG/ACT nasal spray 2 sprays into the nostril(s) as directed by provider Daily. 18.2 mL 11   • hydroCHLOROthiazide (HYDRODIURIL) 25 MG tablet Take 1 tablet by mouth Daily. 90 tablet 3   • lisinopril (PRINIVIL,ZESTRIL) 40 MG tablet Take 1 tablet by mouth Daily. 90 tablet 3   • loratadine (Claritin) 10 MG tablet Take 1 tablet by mouth Daily. 90 tablet 3   • rosuvastatin (CRESTOR) 5 MG tablet Take 1 tablet by mouth Daily. 90 tablet 3     No facility-administered encounter medications on file as of 12/30/2020.        The following portions of the patient's history were reviewed and updated as appropriate: allergies, current medications, past family history, past medical history, past social history, past surgical history and problem list.    Review of Systems   Constitutional: Negative for appetite change, fever and unexpected weight  loss.   HENT: Positive for sinus pressure. Negative for nosebleeds, sore throat and trouble swallowing.    Eyes: Negative for visual disturbance.   Respiratory: Positive for cough. Negative for shortness of breath and wheezing.    Cardiovascular: Negative for chest pain, palpitations and leg swelling.   Gastrointestinal: Negative for abdominal pain, blood in stool, constipation, diarrhea, nausea and vomiting.   Endocrine: Negative for polydipsia, polyphagia and polyuria.   Genitourinary: Negative for dysuria, frequency, hematuria and urinary incontinence.   Musculoskeletal: Negative for arthralgias, gait problem, joint swelling and myalgias.   Skin: Negative for rash.   Neurological: Negative for dizziness, seizures, syncope and numbness.   Hematological: Negative for adenopathy. Does not bruise/bleed easily.   Psychiatric/Behavioral: Negative for sleep disturbance and depressed mood. The patient is not nervous/anxious.        PHQ-2 Depression Screening  Little interest or pleasure in doing things?  0   Feeling down, depressed, or hopeless?  0   PHQ-2 Total Score  0        Objective     There were no vitals taken for this visit.    Physical Exam  Constitutional:       General: He is not in acute distress.     Appearance: Normal appearance. He is well-developed.   HENT:      Head: Normocephalic and atraumatic.      Nose: Nose normal.   Eyes:      General:         Right eye: No discharge.         Left eye: No discharge.      Conjunctiva/sclera: Conjunctivae normal.   Pulmonary:      Effort: Pulmonary effort is normal. No respiratory distress.   Skin:     Findings: No rash.   Neurological:      General: No focal deficit present.      Mental Status: He is alert and oriented to person, place, and time. Mental status is at baseline.   Psychiatric:         Mood and Affect: Mood normal.         Behavior: Behavior normal.         Thought Content: Thought content normal.         Judgment: Judgment normal.            Assessment/Plan   Diagnoses and all orders for this visit:    1. Acute maxillary sinusitis, recurrence not specified (Primary)  -     azithromycin (Zithromax Z-Marko) 250 MG tablet; Take 2 tablets by mouth on day 1, then 1 tablet daily on days 2-5  Dispense: 6 tablet; Refill: 0    2. COVID-19  -     azithromycin (Zithromax Z-Marko) 250 MG tablet; Take 2 tablets by mouth on day 1, then 1 tablet daily on days 2-5  Dispense: 6 tablet; Refill: 0  -     QUESTIONNAIRE SERIES    Increase fluids, zinc, vit c and vit D. Reviewed warning signs of covid resp distress and when to seek emergency medical care. May need albuterol if become sob or has wheezing. Patient verbalized understanding.  Follow up as needed.  This was an audio and video enabled telemedicine encounter.

## 2021-01-07 ENCOUNTER — TELEPHONE (OUTPATIENT)
Dept: FAMILY MEDICINE CLINIC | Facility: CLINIC | Age: 66
End: 2021-01-07

## 2021-01-07 DIAGNOSIS — J01.00 ACUTE MAXILLARY SINUSITIS, RECURRENCE NOT SPECIFIED: Primary | ICD-10-CM

## 2021-01-07 RX ORDER — AMOXICILLIN AND CLAVULANATE POTASSIUM 875; 125 MG/1; MG/1
1 TABLET, FILM COATED ORAL 2 TIMES DAILY
Qty: 20 TABLET | Refills: 0 | Status: SHIPPED | OUTPATIENT
Start: 2021-01-07 | End: 2021-07-26

## 2021-01-07 NOTE — TELEPHONE ENCOUNTER
Will send in Augmentin. If still symptomatic will need to see ENT. Cont Flonase, zyrtec and mucinex.

## 2021-01-07 NOTE — TELEPHONE ENCOUNTER
Caller: Elkin Arias    Relationship: Self    Best call back number: [phone]     What medication are you requesting: Z-Marko    What are your current symptoms: Sinus Infection     How long have you been experiencing symptoms: N/A     Have you had these symptoms before:    [x] Yes  [] No    Have you been treated for these symptoms before:   [x] Yes  [] No    If a prescription is needed, what is your preferred pharmacy and phone number: [facility] [address] [facility], [place] - [address] - [phone] [facility] [phone] FX     Additional notes: Patient had an appointment on 12/30/2020 and was prescribed a z-marko for a sinus infection. He states the medication worked but now that he is done with the medication he is still having symptoms.

## 2021-04-23 ENCOUNTER — TELEPHONE (OUTPATIENT)
Dept: FAMILY MEDICINE CLINIC | Facility: CLINIC | Age: 66
End: 2021-04-23

## 2021-07-14 ENCOUNTER — TELEPHONE (OUTPATIENT)
Dept: FAMILY MEDICINE CLINIC | Facility: CLINIC | Age: 66
End: 2021-07-14

## 2021-07-14 NOTE — TELEPHONE ENCOUNTER
Caller: Elkin Arias    Relationship to patient: Self    Best call back number: [phone]    Chief complaint: FEELS LIKE HE HAS FLUID IN HIS EARS AFTER A SINUS INFECTION    Type of visit: OFFICE    Requested date: 07/20 AFTER 11:30AM

## 2021-07-26 ENCOUNTER — OFFICE VISIT (OUTPATIENT)
Dept: FAMILY MEDICINE CLINIC | Facility: CLINIC | Age: 66
End: 2021-07-26

## 2021-07-26 ENCOUNTER — TELEPHONE (OUTPATIENT)
Dept: FAMILY MEDICINE CLINIC | Facility: CLINIC | Age: 66
End: 2021-07-26

## 2021-07-26 VITALS
TEMPERATURE: 97 F | OXYGEN SATURATION: 98 % | WEIGHT: 205 LBS | RESPIRATION RATE: 15 BRPM | BODY MASS INDEX: 34.11 KG/M2 | SYSTOLIC BLOOD PRESSURE: 150 MMHG | HEART RATE: 70 BPM | DIASTOLIC BLOOD PRESSURE: 92 MMHG

## 2021-07-26 DIAGNOSIS — Z87.891 PERSONAL HISTORY OF TOBACCO USE, PRESENTING HAZARDS TO HEALTH: ICD-10-CM

## 2021-07-26 DIAGNOSIS — E78.5 HYPERLIPIDEMIA, UNSPECIFIED HYPERLIPIDEMIA TYPE: Chronic | ICD-10-CM

## 2021-07-26 DIAGNOSIS — J30.1 SEASONAL ALLERGIC RHINITIS DUE TO POLLEN: ICD-10-CM

## 2021-07-26 DIAGNOSIS — I10 ESSENTIAL HYPERTENSION: Primary | Chronic | ICD-10-CM

## 2021-07-26 DIAGNOSIS — Z13.6 SCREENING FOR AAA (ABDOMINAL AORTIC ANEURYSM): ICD-10-CM

## 2021-07-26 DIAGNOSIS — Z23 NEED FOR 23-POLYVALENT PNEUMOCOCCAL POLYSACCHARIDE VACCINE: ICD-10-CM

## 2021-07-26 DIAGNOSIS — H69.83 ETD (EUSTACHIAN TUBE DYSFUNCTION), BILATERAL: Chronic | ICD-10-CM

## 2021-07-26 PROBLEM — H69.93 ETD (EUSTACHIAN TUBE DYSFUNCTION), BILATERAL: Status: ACTIVE | Noted: 2021-07-26

## 2021-07-26 PROBLEM — H69.93 ETD (EUSTACHIAN TUBE DYSFUNCTION), BILATERAL: Chronic | Status: ACTIVE | Noted: 2021-07-26

## 2021-07-26 PROCEDURE — 99213 OFFICE O/P EST LOW 20 MIN: CPT | Performed by: FAMILY MEDICINE

## 2021-07-26 RX ORDER — FLUTICASONE PROPIONATE 50 MCG
2 SPRAY, SUSPENSION (ML) NASAL DAILY
Qty: 18.2 ML | Refills: 11 | Status: SHIPPED | OUTPATIENT
Start: 2021-07-26 | End: 2021-10-27 | Stop reason: SDUPTHER

## 2021-07-26 RX ORDER — MONTELUKAST SODIUM 10 MG/1
1 TABLET ORAL DAILY
COMMUNITY
Start: 2021-07-20 | End: 2021-10-27 | Stop reason: SDUPTHER

## 2021-07-26 NOTE — TELEPHONE ENCOUNTER
I do not understand this message.  Is he worried he will have reaction to ct contrast dye.  The AAA screen is an ultrasound.  No prep or dye.

## 2021-07-26 NOTE — TELEPHONE ENCOUNTER
Caller: Elkin Arias    Relationship to patient: Self    Best call back number: [phone]     What is the call regarding:  PATIENT STATES THAT HE IS HAVING A TEST RUN.  HE MIGHT BE ALLERGIC TO SHRIMP BECAUSE THE TWO TIMES THAT HE HAS EATEN IT MAKE HIM SICK TO HIS STOMACH.  HE HAS NOT BEEN TESTED, THOUGH.  HIS DAUGHTER SAID THAT HE MIGHT NOT SHOULD TAKE THE PREP NEEDED OR THE DYE.

## 2021-07-26 NOTE — PROGRESS NOTES
Linda Arias is a 65 y.o. male.     History of Present Illness had appt at [facility] in [place].  Was given antibiotic and sx have improved.  He was seen last week.  He was rx singulair. He was given another nasal spray.  He has seen ENT and has future surgery scheduled.      Requesting pna 23 vaccine.      Plans to contact ENT.     The following portions of the patient's history were reviewed and updated as appropriate: allergies, current medications, past family history, past medical history, past social history, past surgical history and problem list.    Review of Systems   Constitutional: Negative for chills, fatigue, fever and unexpected weight change.   HENT: Negative for congestion, ear pain, nosebleeds, rhinorrhea, sinus pressure, sneezing, sore throat and trouble swallowing.    Eyes: Negative for itching and visual disturbance.   Respiratory: Negative for cough, chest tightness, shortness of breath and wheezing.    Cardiovascular: Negative for chest pain, palpitations and leg swelling.   Gastrointestinal: Negative for abdominal pain, anal bleeding, blood in stool, constipation, diarrhea, nausea and vomiting.   Endocrine: Negative for cold intolerance, heat intolerance, polydipsia and polyuria.   Genitourinary: Negative for difficulty urinating, frequency, hematuria and urgency.   Musculoskeletal: Negative for back pain, gait problem and myalgias.   Skin: Negative for rash and wound.   Neurological: Negative for dizziness, weakness, numbness and headaches.   Hematological: Negative for adenopathy. Does not bruise/bleed easily.   Psychiatric/Behavioral: Negative for agitation, confusion, decreased concentration and suicidal ideas. The patient is not nervous/anxious.        Objective     Vitals:    07/26/21 1318   BP: 150/92   Pulse: 70   Resp: 15   Temp: 97 °F (36.1 °C)   SpO2: 98%   Weight: 93 kg (205 lb)       Physical Exam  Vitals and nursing note reviewed.   Constitutional:       Appearance: He is  well-developed.   HENT:      Head: Normocephalic and atraumatic.   Eyes:      General: No scleral icterus.        Right eye: No discharge.         Left eye: No discharge.      Conjunctiva/sclera: Conjunctivae normal.      Pupils: Pupils are equal, round, and reactive to light.   Neck:      Thyroid: No thyromegaly.      Vascular: No JVD.      Trachea: No tracheal deviation.   Cardiovascular:      Rate and Rhythm: Normal rate and regular rhythm.      Heart sounds: Normal heart sounds. No murmur heard.   No friction rub. No gallop.    Pulmonary:      Effort: Pulmonary effort is normal. No respiratory distress.      Breath sounds: Normal breath sounds. No wheezing or rales.   Chest:      Chest wall: No tenderness.   Abdominal:      General: Bowel sounds are normal. There is no distension.      Palpations: Abdomen is soft. There is no mass.      Tenderness: There is no abdominal tenderness.   Musculoskeletal:         General: Normal range of motion.      Cervical back: Normal range of motion and neck supple.   Skin:     General: Skin is warm and dry.   Neurological:      Mental Status: He is alert and oriented to person, place, and time.      Cranial Nerves: No cranial nerve deficit.      Coordination: Coordination normal.      Deep Tendon Reflexes: Reflexes are normal and symmetric. Reflexes normal.   Psychiatric:         Behavior: Behavior normal.         Thought Content: Thought content normal.         Judgment: Judgment normal.         Assessment/Plan     Problem List Items Addressed This Visit        Cardiac and Vasculature    Essential hypertension - Primary (Chronic)    Hyperlipidemia (Chronic)       ENT    ETD (Eustachian tube dysfunction), bilateral (Chronic)      Other Visit Diagnoses     Seasonal allergic rhinitis due to pollen        Relevant Medications    fluticasone (FLONASE) 50 MCG/ACT nasal spray    Need for 23-polyvalent pneumococcal polysaccharide vaccine        Relevant Medications    pneumococcal  polysaccharide 23-valent (PNEUMOVAX-23) vaccine 0.5 mL    Personal history of tobacco use, presenting hazards to health        Relevant Orders    [place] aaa screen limited    Screening for AAA (abdominal aortic aneurysm)        Relevant Orders    [place] aaa screen limited

## 2021-08-10 ENCOUNTER — HOSPITAL ENCOUNTER (OUTPATIENT)
Dept: ULTRASOUND IMAGING | Facility: HOSPITAL | Age: 66
Discharge: HOME | End: 2021-08-10
Admitting: FAMILY MEDICINE

## 2021-08-10 DIAGNOSIS — Z87.891 PERSONAL HISTORY OF TOBACCO USE, PRESENTING HAZARDS TO HEALTH: ICD-10-CM

## 2021-08-10 DIAGNOSIS — Z13.6 SCREENING FOR AAA (ABDOMINAL AORTIC ANEURYSM): ICD-10-CM

## 2021-08-10 PROCEDURE — 76706 US ABDL AORTA SCREEN AAA: CPT

## 2021-10-16 DIAGNOSIS — F41.9 ANXIETY: ICD-10-CM

## 2021-10-16 DIAGNOSIS — I10 ESSENTIAL HYPERTENSION: ICD-10-CM

## 2021-10-18 RX ORDER — LISINOPRIL 40 MG/1
TABLET ORAL
Qty: 90 TABLET | Refills: 3 | Status: SHIPPED | OUTPATIENT
Start: 2021-10-18 | End: 2021-10-27 | Stop reason: SDUPTHER

## 2021-10-18 RX ORDER — HYDROCHLOROTHIAZIDE 25 MG/1
TABLET ORAL
Qty: 90 TABLET | Refills: 3 | Status: SHIPPED | OUTPATIENT
Start: 2021-10-18 | End: 2021-10-27 | Stop reason: SDUPTHER

## 2021-10-18 RX ORDER — CITALOPRAM 10 MG/1
TABLET ORAL
Qty: 90 TABLET | Refills: 3 | Status: SHIPPED | OUTPATIENT
Start: 2021-10-18 | End: 2021-10-27 | Stop reason: SDUPTHER

## 2021-10-18 RX ORDER — AMLODIPINE BESYLATE 5 MG/1
TABLET ORAL
Qty: 90 TABLET | Refills: 3 | Status: SHIPPED | OUTPATIENT
Start: 2021-10-18 | End: 2021-10-27 | Stop reason: SDUPTHER

## 2021-10-25 ENCOUNTER — TELEPHONE (OUTPATIENT)
Dept: FAMILY MEDICINE CLINIC | Facility: CLINIC | Age: 66
End: 2021-10-25

## 2021-10-25 NOTE — TELEPHONE ENCOUNTER
Caller: Elkin Arias    Relationship to patient: Self    Best call back number: [phone]    Chief complaint:     Type of visit: SUBSEQUENT MEDICARE WELLNESS    Requested date: NEXT AVAILABLE    If rescheduling, when is the original appointment: APPOINTMENT WAS TODAY PATIENT THOUGHT HE CANCELLED IT     Additional notes: I WAS UNABLE TO RESCHEDULE AND WAS UNABLE TO WARM TRANSFER. PLEASE CALL PATIENT TO RESCHEDULE

## 2021-10-27 ENCOUNTER — OFFICE VISIT (OUTPATIENT)
Dept: FAMILY MEDICINE CLINIC | Facility: CLINIC | Age: 66
End: 2021-10-27

## 2021-10-27 VITALS
HEART RATE: 70 BPM | SYSTOLIC BLOOD PRESSURE: 160 MMHG | TEMPERATURE: 97.7 F | RESPIRATION RATE: 15 BRPM | BODY MASS INDEX: 33.82 KG/M2 | DIASTOLIC BLOOD PRESSURE: 80 MMHG | OXYGEN SATURATION: 97 % | HEIGHT: 65 IN | WEIGHT: 203 LBS

## 2021-10-27 DIAGNOSIS — M35.3 PMR (POLYMYALGIA RHEUMATICA) (HCC): ICD-10-CM

## 2021-10-27 DIAGNOSIS — I10 ESSENTIAL HYPERTENSION: Chronic | ICD-10-CM

## 2021-10-27 DIAGNOSIS — J30.1 SEASONAL ALLERGIC RHINITIS DUE TO POLLEN: Chronic | ICD-10-CM

## 2021-10-27 DIAGNOSIS — F41.9 ANXIETY: ICD-10-CM

## 2021-10-27 DIAGNOSIS — Z23 NEEDS FLU SHOT: ICD-10-CM

## 2021-10-27 DIAGNOSIS — Z00.00 ENCOUNTER FOR MEDICARE ANNUAL WELLNESS EXAM: Primary | ICD-10-CM

## 2021-10-27 DIAGNOSIS — E78.5 HYPERLIPIDEMIA, UNSPECIFIED HYPERLIPIDEMIA TYPE: ICD-10-CM

## 2021-10-27 PROCEDURE — 1159F MED LIST DOCD IN RCRD: CPT | Performed by: FAMILY MEDICINE

## 2021-10-27 PROCEDURE — 80061 LIPID PANEL: CPT | Performed by: FAMILY MEDICINE

## 2021-10-27 PROCEDURE — 80053 COMPREHEN METABOLIC PANEL: CPT | Performed by: FAMILY MEDICINE

## 2021-10-27 PROCEDURE — G0008 ADMIN INFLUENZA VIRUS VAC: HCPCS | Performed by: FAMILY MEDICINE

## 2021-10-27 PROCEDURE — 85025 COMPLETE CBC W/AUTO DIFF WBC: CPT | Performed by: FAMILY MEDICINE

## 2021-10-27 PROCEDURE — 1126F AMNT PAIN NOTED NONE PRSNT: CPT | Performed by: FAMILY MEDICINE

## 2021-10-27 PROCEDURE — 90662 IIV NO PRSV INCREASED AG IM: CPT | Performed by: FAMILY MEDICINE

## 2021-10-27 PROCEDURE — G0439 PPPS, SUBSEQ VISIT: HCPCS | Performed by: FAMILY MEDICINE

## 2021-10-27 RX ORDER — ROSUVASTATIN CALCIUM 5 MG/1
5 TABLET, COATED ORAL DAILY
Qty: 90 TABLET | Refills: 3 | Status: SHIPPED | OUTPATIENT
Start: 2021-10-27

## 2021-10-27 RX ORDER — FLUTICASONE PROPIONATE 50 MCG
2 SPRAY, SUSPENSION (ML) NASAL DAILY
Qty: 18.2 ML | Refills: 11 | Status: SHIPPED | OUTPATIENT
Start: 2021-10-27

## 2021-10-27 RX ORDER — MONTELUKAST SODIUM 10 MG/1
10 TABLET ORAL DAILY
Qty: 90 TABLET | Refills: 3 | Status: SHIPPED | OUTPATIENT
Start: 2021-10-27

## 2021-10-27 RX ORDER — AMLODIPINE BESYLATE 5 MG/1
5 TABLET ORAL DAILY
Qty: 90 TABLET | Refills: 3 | Status: SHIPPED | OUTPATIENT
Start: 2021-10-27 | End: 2023-02-06

## 2021-10-27 RX ORDER — HYDROCHLOROTHIAZIDE 25 MG/1
25 TABLET ORAL DAILY
Qty: 90 TABLET | Refills: 3 | Status: SHIPPED | OUTPATIENT
Start: 2021-10-27 | End: 2023-01-23

## 2021-10-27 RX ORDER — LISINOPRIL 40 MG/1
40 TABLET ORAL DAILY
Qty: 90 TABLET | Refills: 3 | Status: SHIPPED | OUTPATIENT
Start: 2021-10-27 | End: 2023-01-23

## 2021-10-27 RX ORDER — CITALOPRAM 10 MG/1
10 TABLET ORAL DAILY
Qty: 90 TABLET | Refills: 3 | Status: SHIPPED | OUTPATIENT
Start: 2021-10-27 | End: 2023-01-23

## 2021-10-27 NOTE — PROGRESS NOTES
The ABCs of the Annual Wellness Visit  Subsequent Medicare Wellness Visit    Chief Complaint   Patient presents with   • Medicare Wellness-subsequent      Subjective    History of Present Illness:  Elkin Arias is a 66 y.o. male who presents for a Subsequent Medicare Wellness Visit.    The following portions of the patient's history were reviewed and   updated as appropriate: allergies, current medications, past family history, past medical history, past social history, past surgical history and problem list.    Compared to one year ago, the patient feels his physical   health is worse. He is seeing rheum PMR has appt to go back and see rheum.     Compared to one year ago, the patient feels his mental   health is better.    Recent Hospitalizations:  He was not admitted to the hospital during the last year.       Current Medical Providers:  Patient Care Team:  Sho Kim MD as PCP - General (Family Medicine)    Outpatient Medications Prior to Visit   Medication Sig Dispense Refill   • amLODIPine (NORVASC) 5 MG tablet TAKE 1 TABLET DAILY 90 tablet 3   • citalopram (CeleXA) 10 MG tablet TAKE 1 TABLET DAILY 90 tablet 3   • fluticasone (FLONASE) 50 MCG/ACT nasal spray 2 sprays into the nostril(s) as directed by provider Daily. 18.2 mL 11   • hydroCHLOROthiazide (HYDRODIURIL) 25 MG tablet TAKE 1 TABLET DAILY 90 tablet 3   • lisinopril (PRINIVIL,ZESTRIL) 40 MG tablet TAKE 1 TABLET DAILY 90 tablet 3   • montelukast (SINGULAIR) 10 MG tablet Take 1 tablet by mouth Daily.     • rosuvastatin (CRESTOR) 5 MG tablet Take 1 tablet by mouth Daily. 90 tablet 3     No facility-administered medications prior to visit.       No opioid medication identified on active medication list. I have reviewed chart for other potential  high risk medication/s and harmful drug interactions in the elderly.          Aspirin is not on active medication list.  Aspirin use is indicated based on review of current medical condition/s. Pros and cons  "of this therapy have been discussed with this patient. Benefits of this medication outweigh potential harm.  Patient has been instructed to start taking this medication..    Patient Active Problem List   Diagnosis   • Essential hypertension   • Hyperlipidemia   • Other fatigue   • Erectile dysfunction   • Anxiety   • PMR (polymyalgia rheumatica) (HCC)   • ETD (Eustachian tube dysfunction), bilateral   • Need for 23-polyvalent pneumococcal polysaccharide vaccine   • Seasonal allergic rhinitis due to pollen   • Screening for AAA (abdominal aortic aneurysm)   • Personal history of tobacco use, presenting hazards to health   • Needs flu shot   • Encounter for Medicare annual wellness exam     Advance Care Planning  Advance Directive is not on file.  ACP discussion was held with the patient during this visit. Patient has an advance directive (not in EMR), copy requested.          Objective    Vitals:    10/27/21 0955   BP: 160/80   Pulse: 70   Resp: 15   Temp: 97.7 °F (36.5 °C)   SpO2: 97%   Weight: 92.1 kg (203 lb)   Height: 165.1 cm (65\")   PainSc: 0-No pain     BMI Readings from Last 1 Encounters:   10/27/21 33.78 kg/m²   BMI is above normal parameters. Recommendations include: exercise counseling    Does the patient have evidence of cognitive impairment? No    Physical Exam            HEALTH RISK ASSESSMENT    Smoking Status:  Social History     Tobacco Use   Smoking Status Former Smoker   Smokeless Tobacco Never Used   Tobacco Comment    some in the 70s     Alcohol Consumption:  Social History     Substance and Sexual Activity   Alcohol Use Yes    Comment: 1 beer a month on average     Fall Risk Screen:    ARJUN Fall Risk Assessment was completed, and patient is at LOW risk for falls.Assessment completed on:10/27/2021    Depression Screening:  PHQ-2/PHQ-9 Depression Screening 10/27/2021   Little interest or pleasure in doing things 0   Feeling down, depressed, or hopeless 0   Total Score 0       Health Habits and " Functional and Cognitive Screening:  Functional & Cognitive Status 10/27/2021   Do you have difficulty preparing food and eating? No   Do you have difficulty bathing yourself, getting dressed or grooming yourself? No   Do you have difficulty using the toilet? No   Do you have difficulty moving around from place to place? No   Do you have trouble with steps or getting out of a bed or a chair? No   Current Diet Well Balanced Diet   Dental Exam Up to date   Eye Exam Up to date   Exercise (times per week) 7 times per week   Current Exercises Include Cardiovascular Workout;Stair Step Machine   Current Exercise Activities Include -   Do you need help using the phone?  No   Are you deaf or do you have serious difficulty hearing?  Yes   Do you need help with transportation? No   Do you need help shopping? No   Do you need help preparing meals?  No   Do you need help with housework?  No   Do you need help with laundry? No   Do you need help taking your medications? No   Do you need help managing money? No   Do you ever drive or ride in a car without wearing a seat belt? No   Have you felt unusual stress, anger or loneliness in the last month? -   Who do you live with? -   If you need help, do you have trouble finding someone available to you? -   Have you been bothered in the last four weeks by sexual problems? -   Do you have difficulty concentrating, remembering or making decisions? -       Age-appropriate Screening Schedule:  Refer to the list below for future screening recommendations based on patient's age, sex and/or medical conditions. Orders for these recommended tests are listed in the plan section. The patient has been provided with a written plan.    Health Maintenance   Topic Date Due   • LIPID PANEL  01/20/2021   • INFLUENZA VACCINE  08/01/2021   • ZOSTER VACCINE (2 of 3) 11/05/2022 (Originally 11/22/2016)   • TDAP/TD VACCINES (4 - Td or Tdap) 03/04/2029              Assessment/Plan   CMS Preventative Services  Quick Reference  Risk Factors Identified During Encounter  Alcohol Misuse  Cardiovascular Disease  Immunizations Discussed/Encouraged (specific Immunizations; Influenza  The above risks/problems have been discussed with the patient.  Follow up actions/plans if indicated are seen below in the Assessment/Plan Section.  Pertinent information has been shared with the patient in the After Visit Summary.    Diagnoses and all orders for this visit:    1. Essential hypertension (Primary)  -     amLODIPine (NORVASC) 5 MG tablet; Take 1 tablet by mouth Daily.  Dispense: 90 tablet; Refill: 3  -     hydroCHLOROthiazide (HYDRODIURIL) 25 MG tablet; Take 1 tablet by mouth Daily.  Dispense: 90 tablet; Refill: 3  -     lisinopril (PRINIVIL,ZESTRIL) 40 MG tablet; Take 1 tablet by mouth Daily.  Dispense: 90 tablet; Refill: 3    2. Hyperlipidemia, unspecified hyperlipidemia type    3. PMR (polymyalgia rheumatica) (HCC)    4. Seasonal allergic rhinitis due to pollen  -     fluticasone (FLONASE) 50 MCG/ACT nasal spray; 2 sprays into the nostril(s) as directed by provider Daily.  Dispense: 18.2 mL; Refill: 11    5. Needs flu shot  -     Fluzone High-Dose 65+yrs (7536-3634)    6. Anxiety  -     citalopram (CeleXA) 10 MG tablet; Take 1 tablet by mouth Daily.  Dispense: 90 tablet; Refill: 3    7. Encounter for Medicare annual wellness exam  -     rosuvastatin (CRESTOR) 5 MG tablet; Take 1 tablet by mouth Daily.  Dispense: 90 tablet; Refill: 3    Other orders  -     montelukast (SINGULAIR) 10 MG tablet; Take 1 tablet by mouth Daily.  Dispense: 90 tablet; Refill: 3        Follow Up:   No follow-ups on file.     An After Visit Summary and PPPS were made available to the patient.

## 2021-10-28 LAB
ALBUMIN SERPL-MCNC: 4.6 G/DL (ref 3.5–5.2)
ALBUMIN/GLOB SERPL: 1.5 G/DL
ALP SERPL-CCNC: 72 U/L (ref 39–117)
ALT SERPL W P-5'-P-CCNC: 28 U/L (ref 1–41)
ANION GAP SERPL CALCULATED.3IONS-SCNC: 15.9 MMOL/L (ref 5–15)
AST SERPL-CCNC: 33 U/L (ref 1–40)
BASOPHILS # BLD AUTO: 0.07 10*3/MM3 (ref 0–0.2)
BASOPHILS NFR BLD AUTO: 0.8 % (ref 0–1.5)
BILIRUB SERPL-MCNC: 0.3 MG/DL (ref 0–1.2)
BUN SERPL-MCNC: 18 MG/DL (ref 8–23)
BUN/CREAT SERPL: 19.8 (ref 7–25)
CALCIUM SPEC-SCNC: 9.9 MG/DL (ref 8.6–10.5)
CHLORIDE SERPL-SCNC: 98 MMOL/L (ref 98–107)
CHOLEST SERPL-MCNC: 188 MG/DL (ref 0–200)
CO2 SERPL-SCNC: 23.1 MMOL/L (ref 22–29)
CREAT SERPL-MCNC: 0.91 MG/DL (ref 0.76–1.27)
DEPRECATED RDW RBC AUTO: 42.5 FL (ref 37–54)
EOSINOPHIL # BLD AUTO: 0.23 10*3/MM3 (ref 0–0.4)
EOSINOPHIL NFR BLD AUTO: 2.7 % (ref 0.3–6.2)
ERYTHROCYTE [DISTWIDTH] IN BLOOD BY AUTOMATED COUNT: 14 % (ref 12.3–15.4)
GFR SERPL CREATININE-BSD FRML MDRD: 83 ML/MIN/1.73
GLOBULIN UR ELPH-MCNC: 3 GM/DL
GLUCOSE SERPL-MCNC: 80 MG/DL (ref 65–99)
HCT VFR BLD AUTO: 42.4 % (ref 37.5–51)
HDLC SERPL-MCNC: 42 MG/DL (ref 40–60)
HGB BLD-MCNC: 13.7 G/DL (ref 13–17.7)
IMM GRANULOCYTES # BLD AUTO: 0.03 10*3/MM3 (ref 0–0.05)
IMM GRANULOCYTES NFR BLD AUTO: 0.3 % (ref 0–0.5)
LDLC SERPL CALC-MCNC: 123 MG/DL (ref 0–100)
LDLC/HDLC SERPL: 2.88 {RATIO}
LYMPHOCYTES # BLD AUTO: 2.43 10*3/MM3 (ref 0.7–3.1)
LYMPHOCYTES NFR BLD AUTO: 28.2 % (ref 19.6–45.3)
MCH RBC QN AUTO: 27.1 PG (ref 26.6–33)
MCHC RBC AUTO-ENTMCNC: 32.3 G/DL (ref 31.5–35.7)
MCV RBC AUTO: 83.8 FL (ref 79–97)
MONOCYTES # BLD AUTO: 0.9 10*3/MM3 (ref 0.1–0.9)
MONOCYTES NFR BLD AUTO: 10.5 % (ref 5–12)
NEUTROPHILS NFR BLD AUTO: 4.95 10*3/MM3 (ref 1.7–7)
NEUTROPHILS NFR BLD AUTO: 57.5 % (ref 42.7–76)
NRBC BLD AUTO-RTO: 0 /100 WBC (ref 0–0.2)
PLATELET # BLD AUTO: 312 10*3/MM3 (ref 140–450)
PMV BLD AUTO: 11.8 FL (ref 6–12)
POTASSIUM SERPL-SCNC: 4.4 MMOL/L (ref 3.5–5.2)
PROT SERPL-MCNC: 7.6 G/DL (ref 6–8.5)
RBC # BLD AUTO: 5.06 10*6/MM3 (ref 4.14–5.8)
SODIUM SERPL-SCNC: 137 MMOL/L (ref 136–145)
TRIGL SERPL-MCNC: 125 MG/DL (ref 0–150)
VLDLC SERPL-MCNC: 23 MG/DL (ref 5–40)
WBC # BLD AUTO: 8.61 10*3/MM3 (ref 3.4–10.8)

## 2022-02-25 NOTE — PROGRESS NOTES
Pt left ambulatory with steady gait.      I[place]ory [place], [facility]  02/25/22 5912 Subjective   Chief Complaint   Patient presents with   • Earache     This visit was conducted via video    Elkin Arias is a 64 y.o. male.     Earache    There is pain in both (worse on right) ears. This is a new problem. Episode onset: 2 weeks. The problem has been gradually worsening. There has been no fever. Associated symptoms include hearing loss. Pertinent negatives include no abdominal pain, coughing, diarrhea, ear discharge, headaches, neck pain, rash, rhinorrhea, sore throat or vomiting. Treatments tried: flonase. The treatment provided no relief.        No Known Allergies    Past Medical History:   Diagnosis Date   • Anxiety    • Arthritis    • Hyperlipidemia    • Hypertension    • Polymyalgia rheumatica (CMS/HCC)        Past Surgical History:   Procedure Laterality Date   • KNEE SURGERY     • LIPOMA EXCISION     • TONSILLECTOMY         Social History     Socioeconomic History   • Marital status:      Spouse name: Not on file   • Number of children: Not on file   • Years of education: Not on file   • Highest education level: Not on file   Tobacco Use   • Smoking status: Former Smoker   • Smokeless tobacco: Never Used   • Tobacco comment: some in the 70s   Substance and Sexual Activity   • Alcohol use: Yes     Comment: 1 beer a month on average   • Drug use: Never   • Sexual activity: Defer       Family History   Problem Relation Age of Onset   • Emphysema Mother    • Pancreatic cancer Father    • Colon cancer Sister          Current Outpatient Medications:   •  amLODIPine (NORVASC) 5 MG tablet, Take 1 tablet by mouth Daily., Disp: 90 tablet, Rfl: 0  •  citalopram (CeleXA) 10 MG tablet, Take 1 tablet by mouth Daily., Disp: 90 tablet, Rfl: 0  •  fluticasone (FLONASE) 50 MCG/ACT nasal spray, 2 sprays into the nostril(s) as directed by provider., Disp: , Rfl:   •  hydroCHLOROthiazide (HYDRODIURIL) 25 MG tablet, Take 1 tablet by mouth Daily., Disp: 90 tablet, Rfl: 1  •  lisinopril (PRINIVIL,ZESTRIL) 40 MG  tablet, , Disp: , Rfl:   •  rosuvastatin (CRESTOR) 5 MG tablet, Every Other Day., Disp: , Rfl:   •  amoxicillin-clavulanate (Augmentin) 875-125 MG per tablet, Take 1 tablet by mouth 2 (Two) Times a Day for 7 days., Disp: 14 tablet, Rfl: 0  •  loratadine (Claritin) 10 MG tablet, Take 1 tablet by mouth Daily., Disp: 30 tablet, Rfl: 2      Review of Systems   Constitutional: Negative for chills, diaphoresis, fatigue and fever.   HENT: Positive for ear pain, hearing loss and postnasal drip. Negative for ear discharge, rhinorrhea and sore throat.    Respiratory: Negative for cough.    Gastrointestinal: Negative for abdominal pain, diarrhea and vomiting.   Musculoskeletal: Negative for neck pain.   Skin: Negative for rash.   Neurological: Negative for dizziness and headache.        There were no vitals filed for this visit.    Objective   Physical Exam   Constitutional: He appears well-developed and well-nourished.   Neurological: He is alert.        Procedures     Assessment/Plan   Elkin was seen today for earache.    Diagnoses and all orders for this visit:    Earache symptoms, bilateral  -     amoxicillin-clavulanate (Augmentin) 875-125 MG per tablet; Take 1 tablet by mouth 2 (Two) Times a Day for 7 days.  -     loratadine (Claritin) 10 MG tablet; Take 1 tablet by mouth Daily.        -Continue Flonase and Claritin  -Take medications as prescribed  -If symptoms worsen to do not improve, follow up with primary doctor or visit nearest urgent care      PLAN: Discussed dosing, side effects, recommended other symptomatic care.  Patient should follow up with primary care provider if symptoms worsen, fail to resolve or other symptoms need attention. Patient/family agree to the above.     Jill Beltrán, APRN

## 2023-01-19 DIAGNOSIS — I10 ESSENTIAL HYPERTENSION: Chronic | ICD-10-CM

## 2023-01-19 DIAGNOSIS — F41.9 ANXIETY: ICD-10-CM

## 2023-01-20 NOTE — TELEPHONE ENCOUNTER
Rx Refill Note  Requested Prescriptions     Pending Prescriptions Disp Refills   • lisinopril (PRINIVIL,ZESTRIL) 40 MG tablet [Pharmacy Med Name: LISINOPRIL TAB 40MG] 90 tablet 3     Sig: TAKE 1 TABLET DAILY   • hydroCHLOROthiazide (HYDRODIURIL) 25 MG tablet [Pharmacy Med Name: HYDROCHLOROT TAB 25MG] 90 tablet 3     Sig: TAKE 1 TABLET DAILY   • citalopram (CeleXA) 10 MG tablet [Pharmacy Med Name: CITALOPRAM TAB 10MG] 90 tablet 3     Sig: TAKE 1 TABLET DAILY      Last office visit with prescribing clinician: 10/27/2021   Last telemedicine visit with prescribing clinician: Visit date not found   Next office visit with prescribing clinician: Visit date not found                         Would you like a call back once the refill request has been completed: [] Yes [] No    If the office needs to give you a call back, can they leave a voicemail: [] Yes [] No    Corrine Wallis MA  01/20/23, 08:16 EST

## 2023-01-23 RX ORDER — CITALOPRAM 10 MG/1
TABLET ORAL
Qty: 90 TABLET | Refills: 3 | Status: SHIPPED | OUTPATIENT
Start: 2023-01-23

## 2023-01-23 RX ORDER — HYDROCHLOROTHIAZIDE 25 MG/1
TABLET ORAL
Qty: 90 TABLET | Refills: 3 | Status: SHIPPED | OUTPATIENT
Start: 2023-01-23

## 2023-01-23 RX ORDER — LISINOPRIL 40 MG/1
TABLET ORAL
Qty: 90 TABLET | Refills: 3 | Status: SHIPPED | OUTPATIENT
Start: 2023-01-23

## 2023-02-02 DIAGNOSIS — I10 ESSENTIAL HYPERTENSION: Chronic | ICD-10-CM

## 2023-02-06 RX ORDER — AMLODIPINE BESYLATE 5 MG/1
TABLET ORAL
Qty: 90 TABLET | Refills: 3 | Status: SHIPPED | OUTPATIENT
Start: 2023-02-06

## 2023-09-14 DIAGNOSIS — I10 ESSENTIAL HYPERTENSION: Chronic | ICD-10-CM

## 2023-09-14 RX ORDER — AMLODIPINE BESYLATE 5 MG/1
TABLET ORAL
Qty: 90 TABLET | Refills: 3 | Status: SHIPPED | OUTPATIENT
Start: 2023-09-14

## 2023-09-14 NOTE — TELEPHONE ENCOUNTER
Rx Refill Note  Requested Prescriptions     Pending Prescriptions Disp Refills    amLODIPine (NORVASC) 5 MG tablet [Pharmacy Med Name: AMLODIPINE TAB 5MG] 90 tablet 3     Sig: TAKE 1 TABLET DAILY      Last office visit with prescribing clinician: 10/27/2021   Last telemedicine visit with prescribing clinician: Visit date not found   Next office visit with prescribing clinician: Visit date not found                         Would you like a call back once the refill request has been completed: [] Yes [] No    If the office needs to give you a call back, can they leave a voicemail: [] Yes [] No    Nadira Jacobson LPN  09/14/23, 11:46 EDT

## 2024-09-15 PROBLEM — M19.049 OSTEOARTHRITIS OF FINGER: Status: ACTIVE | Noted: 2024-09-15

## 2024-09-17 ENCOUNTER — TELEPHONE (OUTPATIENT)
Age: 69
End: 2024-09-17
Payer: MEDICARE

## 2024-09-23 PROBLEM — M19.012 PRIMARY OSTEOARTHRITIS OF LEFT SHOULDER: Status: ACTIVE | Noted: 2024-09-23

## 2024-09-23 PROBLEM — M35.3 POLYMYALGIA RHEUMATICA SYNDROME: Status: ACTIVE | Noted: 2024-09-23

## 2024-09-23 PROBLEM — M19.041 PRIMARY OSTEOARTHRITIS OF BOTH HANDS: Status: ACTIVE | Noted: 2024-09-23

## 2024-09-23 PROBLEM — G56.02 CARPAL TUNNEL SYNDROME OF LEFT WRIST: Status: ACTIVE | Noted: 2024-09-23

## 2024-09-23 PROBLEM — M19.042 PRIMARY OSTEOARTHRITIS OF BOTH HANDS: Status: ACTIVE | Noted: 2024-09-23

## 2024-09-25 ENCOUNTER — OFFICE VISIT (OUTPATIENT)
Age: 69
End: 2024-09-25
Payer: MEDICARE

## 2024-09-25 ENCOUNTER — LAB (OUTPATIENT)
Facility: HOSPITAL | Age: 69
End: 2024-09-25
Payer: MEDICARE

## 2024-09-25 VITALS
DIASTOLIC BLOOD PRESSURE: 80 MMHG | HEART RATE: 58 BPM | BODY MASS INDEX: 31.27 KG/M2 | HEIGHT: 65 IN | SYSTOLIC BLOOD PRESSURE: 132 MMHG | WEIGHT: 187.7 LBS | TEMPERATURE: 98 F

## 2024-09-25 DIAGNOSIS — G56.02 CARPAL TUNNEL SYNDROME OF LEFT WRIST: ICD-10-CM

## 2024-09-25 DIAGNOSIS — M19.012 PRIMARY OSTEOARTHRITIS OF LEFT SHOULDER: ICD-10-CM

## 2024-09-25 DIAGNOSIS — M35.3 POLYMYALGIA RHEUMATICA SYNDROME: ICD-10-CM

## 2024-09-25 DIAGNOSIS — M19.041 PRIMARY OSTEOARTHRITIS OF BOTH HANDS: ICD-10-CM

## 2024-09-25 DIAGNOSIS — M19.042 PRIMARY OSTEOARTHRITIS OF BOTH HANDS: ICD-10-CM

## 2024-09-25 DIAGNOSIS — M35.3 POLYMYALGIA RHEUMATICA SYNDROME: Primary | ICD-10-CM

## 2024-09-25 LAB
ALBUMIN SERPL-MCNC: 4.4 G/DL (ref 3.5–5.2)
ALBUMIN/GLOB SERPL: 1.8 G/DL
ALP SERPL-CCNC: 72 U/L (ref 39–117)
ALT SERPL W P-5'-P-CCNC: 27 U/L (ref 1–41)
ANION GAP SERPL CALCULATED.3IONS-SCNC: 8.4 MMOL/L (ref 5–15)
AST SERPL-CCNC: 25 U/L (ref 1–40)
BASOPHILS # BLD AUTO: 0.08 10*3/MM3 (ref 0–0.2)
BASOPHILS NFR BLD AUTO: 1 % (ref 0–1.5)
BILIRUB SERPL-MCNC: 0.6 MG/DL (ref 0–1.2)
BUN SERPL-MCNC: 22 MG/DL (ref 8–23)
BUN/CREAT SERPL: 21.4 (ref 7–25)
CALCIUM SPEC-SCNC: 10 MG/DL (ref 8.6–10.5)
CHLORIDE SERPL-SCNC: 100 MMOL/L (ref 98–107)
CO2 SERPL-SCNC: 27.6 MMOL/L (ref 22–29)
CREAT SERPL-MCNC: 1.03 MG/DL (ref 0.76–1.27)
CRP SERPL-MCNC: 0.62 MG/DL (ref 0–0.5)
DEPRECATED RDW RBC AUTO: 42.5 FL (ref 37–54)
EGFRCR SERPLBLD CKD-EPI 2021: 79.1 ML/MIN/1.73
EOSINOPHIL # BLD AUTO: 0.3 10*3/MM3 (ref 0–0.4)
EOSINOPHIL NFR BLD AUTO: 3.6 % (ref 0.3–6.2)
ERYTHROCYTE [DISTWIDTH] IN BLOOD BY AUTOMATED COUNT: 13.4 % (ref 12.3–15.4)
ERYTHROCYTE [SEDIMENTATION RATE] IN BLOOD: 31 MM/HR (ref 0–20)
GLOBULIN UR ELPH-MCNC: 2.5 GM/DL
GLUCOSE SERPL-MCNC: 114 MG/DL (ref 65–99)
HCT VFR BLD AUTO: 40.7 % (ref 37.5–51)
HGB BLD-MCNC: 13.2 G/DL (ref 13–17.7)
IMM GRANULOCYTES # BLD AUTO: 0.02 10*3/MM3 (ref 0–0.05)
IMM GRANULOCYTES NFR BLD AUTO: 0.2 % (ref 0–0.5)
LYMPHOCYTES # BLD AUTO: 2.34 10*3/MM3 (ref 0.7–3.1)
LYMPHOCYTES NFR BLD AUTO: 27.9 % (ref 19.6–45.3)
MCH RBC QN AUTO: 27.8 PG (ref 26.6–33)
MCHC RBC AUTO-ENTMCNC: 32.4 G/DL (ref 31.5–35.7)
MCV RBC AUTO: 85.7 FL (ref 79–97)
MONOCYTES # BLD AUTO: 0.84 10*3/MM3 (ref 0.1–0.9)
MONOCYTES NFR BLD AUTO: 10 % (ref 5–12)
NEUTROPHILS NFR BLD AUTO: 4.82 10*3/MM3 (ref 1.7–7)
NEUTROPHILS NFR BLD AUTO: 57.3 % (ref 42.7–76)
NRBC BLD AUTO-RTO: 0 /100 WBC (ref 0–0.2)
PLATELET # BLD AUTO: 266 10*3/MM3 (ref 140–450)
PMV BLD AUTO: 11.3 FL (ref 6–12)
POTASSIUM SERPL-SCNC: 4.3 MMOL/L (ref 3.5–5.2)
PROT SERPL-MCNC: 6.9 G/DL (ref 6–8.5)
RBC # BLD AUTO: 4.75 10*6/MM3 (ref 4.14–5.8)
SODIUM SERPL-SCNC: 136 MMOL/L (ref 136–145)
WBC NRBC COR # BLD AUTO: 8.4 10*3/MM3 (ref 3.4–10.8)

## 2024-09-25 PROCEDURE — 36415 COLL VENOUS BLD VENIPUNCTURE: CPT

## 2024-09-25 PROCEDURE — 85025 COMPLETE CBC W/AUTO DIFF WBC: CPT

## 2024-09-25 PROCEDURE — 86140 C-REACTIVE PROTEIN: CPT

## 2024-09-25 PROCEDURE — 85652 RBC SED RATE AUTOMATED: CPT

## 2024-09-25 PROCEDURE — 80053 COMPREHEN METABOLIC PANEL: CPT

## 2024-09-25 RX ORDER — PREDNISONE 5 MG/1
5 TABLET ORAL DAILY
Qty: 90 TABLET | Refills: 1 | Status: SHIPPED | OUTPATIENT
Start: 2024-09-25

## 2025-01-28 NOTE — ASSESSMENT & PLAN NOTE
Considering surgical options. Injections still help.   
RF and CCP negative 11/15  X-rays consistent with OA 11/15, small erosion on L 5th MCP. Hook like osteophytes on 3rd MCP bilaterally..    Stable.  He can use Voltaren gel as needed.  
S/P release with good results.   
Symptoms resolved with 5mg of prednisone.   No GCA symptoms.   Pt global is 2.5/10.    Decrease prednisone to 4 mg.    It could be more degenerative given his known OA.   Get labs today.   Recheck in 2 months.   
none

## 2025-01-28 NOTE — PROGRESS NOTES
Office Follow Up      Date: 01/29/2025   Patient Name: Elkin Arias  MRN: [id number]  YOB: 1955    Referring Physician: No ref. provider found     Chief Complaint: Joint pain      History of Present Illness: Elkin Arias is a 69 y.o. male who is here today for follow up on polymyalgia rheumatica and osteoarthritis.    Prednisone at 5 mg has helped the symptoms completely.   No HA's or visual symptoms. No temporal tenderness.    No joint swelling.  Has OA along with h/o PMR.   Has OA of L shoulder.  Injections help but are becoming less effective.   Ortho wants to do reverse TSR.  Severity level is mild-moderate.  The problem is improving.  Location is shoulder girdle and hip girdle.  The patient describes the discomfort as achy, stiff and tender.  Denies aggravating factors.  Denies relieving factors.  Associated symptoms include fatigue and gelling phenomenon.  Pertinent negatives include fever, headache, jaw claudication, nocturnal pain, rash, scalp tenderness, vision loss and weight loss.      Subjective   Review of Systems: Review of Systems   Constitutional:  Negative for chills, fatigue, fever and unexpected weight loss.   HENT:  Negative for dental problem, mouth sores, sinus pressure and swollen glands.    Eyes:  Negative for photophobia, pain and redness.   Respiratory:  Negative for apnea, cough, chest tightness and shortness of breath.    Cardiovascular:  Negative for chest pain and leg swelling.   Gastrointestinal:  Negative for abdominal pain, diarrhea, nausea and GERD.   Genitourinary:  Negative for dysuria and hematuria.   Skin:  Negative for dry skin, rash, skin lesions and bruise.   Neurological:  Negative for dizziness, seizures, syncope, weakness, headache and memory problem.   Hematological:  Negative for adenopathy. Does not bruise/bleed easily.   Psychiatric/Behavioral:  Negative for sleep disturbance, suicidal ideas, depressed mood and stress. The patient is  "not nervous/anxious.    All other systems reviewed and are negative.       Medications:   Current Outpatient Medications:     amLODIPine (NORVASC) 5 MG tablet, TAKE 1 TABLET DAILY, Disp: 90 tablet, Rfl: 3    aspirin 81 MG EC tablet, Take 1 tablet by mouth Daily., Disp: , Rfl:     citalopram (CeleXA) 10 MG tablet, TAKE 1 TABLET DAILY, Disp: 90 tablet, Rfl: 3    fluticasone (FLONASE) 50 MCG/ACT nasal spray, 2 sprays into the nostril(s) as directed by provider Daily., Disp: 18.2 mL, Rfl: 11    hydroCHLOROthiazide (HYDRODIURIL) 25 MG tablet, TAKE 1 TABLET DAILY, Disp: 90 tablet, Rfl: 3    hydroCHLOROthiazide (MICROZIDE) 12.5 MG capsule, Take 1 capsule by mouth Daily., Disp: , Rfl:     lisinopril (PRINIVIL,ZESTRIL) 40 MG tablet, TAKE 1 TABLET DAILY, Disp: 90 tablet, Rfl: 3    loratadine (Claritin) 10 MG tablet, Take 1 tablet by mouth As Needed., Disp: , Rfl:     losartan-hydrochlorothiazide (HYZAAR) 100-12.5 MG per tablet, Take 1 tablet by mouth Daily., Disp: , Rfl:     metFORMIN (GLUCOPHAGE) 500 MG tablet, Take 1 tablet by mouth 2 (Two) Times a Day With Meals., Disp: , Rfl:     montelukast (SINGULAIR) 10 MG tablet, Take 1 tablet by mouth Daily., Disp: 90 tablet, Rfl: 3    rosuvastatin (CRESTOR) 5 MG tablet, Take 1 tablet by mouth Daily., Disp: 90 tablet, Rfl: 3    predniSONE (DELTASONE) 1 MG tablet, Take 4 tablets by mouth Daily., Disp: 120 tablet, Rfl: 3    Allergies: No Known Allergies    I have reviewed and updated the patient's chief complaint, history of present illness, review of systems, past medical history, surgical history, family history, social history, medications and allergy list as appropriate.     Objective    Vital Signs:   Vitals:    01/29/25 1351   BP: 128/78   Pulse: 70   Temp: 98.1 °F (36.7 °C)   Weight: 89.4 kg (197 lb 1.6 oz)   Height: 165.1 cm (65\")   PainSc:   1   PainLoc: Generalized       Body mass index is 32.8 kg/m².    Physical Exam:  GENERAL: The patient is well developed and well " nourished.  Cooperative and oriented times three.  Affect is normal.  Hydration appears normal.    HEENT: Normocephalic and atraumatic.  No notable alopecia.  No facial rash.  Lids and conjunctiva are normal.  Pupils are equal and sclera are clear.  I see no oral or nasal ulcers.  Lips, teeth, and gums are within normal limits.  Oropharynx is clear. No ropey or tender temporal vessels.  NECK: Neck is supple without adenopathy, masses, or thyromegaly.    LUNGS: Effort is normal.  Lungs are clear without rales or rhonchi.    CARDIOVASCULAR: Normal S1, S2.  No murmurs are heard.   ABDOMEN: Soft and nontender.   EXTREMITIES: There is no edema.    I see no cyanosis or clubbing.    SKIN: Inspection and palpation are normal.  No rashes are present.  No skin lesions.  NEUROLOGIC: Gait is normal.  MUSCULOSKELETAL: Complete joint exam was performed.  There is full range of motion of the shoulders, elbows, wrists, and hands without notable deformities, soft tissue swelling, synovitis, or atrophy. Limited ROM of L shoulder. Shoulder ROM is painful. Osteoarthritic changes are seen in the PIP and DIP joints.  Hips have good flexion and internal and external rotation with pain and stiffness.   Knees have no palpable effusions. Well healed surgical scars are present. There is full extension and full flexion of the knees without pain.  Ankles have no soft tissue swelling, synovitis, or major deformities.    BACK: Straight without notable scoliosis.    Assessment / Plan      Assessment & Plan  Polymyalgia rheumatica syndrome  Symptoms resolved with 5mg of prednisone.   No GCA symptoms.   Pt global is 2.5/10.    Decrease prednisone to 4 mg.    It could be more degenerative given his known OA.   Get labs today.   Recheck in 2 months.   Primary osteoarthritis of both hands  RF and CCP negative 11/15  X-rays consistent with OA 11/15, small erosion on L 5th MCP. Hook like osteophytes on 3rd MCP bilaterally..    Stable.  He can use  Voltaren gel as needed.  Primary osteoarthritis of left shoulder  Considering surgical options. Injections still help.   Carpal tunnel syndrome of left wrist  S/P release with good results.          New Medications Ordered This Visit   Medications    predniSONE (DELTASONE) 1 MG tablet     Sig: Take 4 tablets by mouth Daily.     Dispense:  120 tablet     Refill:  3            Follow Up:   Return in about 2 months (around 3/29/2025).        Hugo Bahena MD  [facility]

## 2025-01-29 ENCOUNTER — OFFICE VISIT (OUTPATIENT)
Age: 70
End: 2025-01-29
Payer: MEDICARE

## 2025-01-29 VITALS
HEART RATE: 70 BPM | BODY MASS INDEX: 32.84 KG/M2 | WEIGHT: 197.1 LBS | HEIGHT: 65 IN | TEMPERATURE: 98.1 F | SYSTOLIC BLOOD PRESSURE: 128 MMHG | DIASTOLIC BLOOD PRESSURE: 78 MMHG

## 2025-01-29 DIAGNOSIS — M19.012 PRIMARY OSTEOARTHRITIS OF LEFT SHOULDER: ICD-10-CM

## 2025-01-29 DIAGNOSIS — M19.041 PRIMARY OSTEOARTHRITIS OF BOTH HANDS: ICD-10-CM

## 2025-01-29 DIAGNOSIS — M35.3 POLYMYALGIA RHEUMATICA SYNDROME: Primary | ICD-10-CM

## 2025-01-29 DIAGNOSIS — G56.02 CARPAL TUNNEL SYNDROME OF LEFT WRIST: ICD-10-CM

## 2025-01-29 DIAGNOSIS — M19.042 PRIMARY OSTEOARTHRITIS OF BOTH HANDS: ICD-10-CM

## 2025-01-29 RX ORDER — PREDNISONE 1 MG/1
4 TABLET ORAL DAILY
Qty: 120 TABLET | Refills: 3 | Status: SHIPPED | OUTPATIENT
Start: 2025-01-29

## 2025-03-07 ENCOUNTER — TELEPHONE (OUTPATIENT)
Age: 70
End: 2025-03-07
Payer: OTHER GOVERNMENT

## 2025-03-07 NOTE — TELEPHONE ENCOUNTER
Patient called regarding prednisone rx. He states he is prescribed 4 mg QD but he decreased to 3 mg QD a couple of weeks ago and is feeling well on this dose. He is wanting to know if it is okay with you to continue to decrease it every couple of weeks or so and go down to 2 mg QD. Please advise.

## 2025-05-20 NOTE — PROGRESS NOTES
Office Follow Up      Date: 05/29/2025   Patient Name: Elkin Arias  MRN: [id number]  YOB: 1955    Referring Physician: No ref. provider found     Chief Complaint: Joint pain      History of Present Illness: Elkin Arias is a 69 y.o. male who is here today for follow up on polymyalgia rheumatica and osteoarthritis.    Weaned off Prednisone completely.   No HA's or visual symptoms. No temporal tenderness.    No joint swelling.  Has OA along with h/o PMR.   Some OA symptoms in the hands.   Has OA of L shoulder.  Injections help but are becoming less effective.   Ortho wants to do reverse TSR.  Pain level is 1/10  EMS is 15 minutes.   Severity level is mild-moderate.  The problem is improving.  Location is shoulder girdle and hip girdle.  The patient describes the discomfort as achy, stiff and tender.  Denies aggravating factors.  Denies relieving factors.  Associated symptoms include fatigue and gelling phenomenon.  Pertinent negatives include fever, headache, jaw claudication, nocturnal pain, rash, scalp tenderness, vision loss and weight loss.      Subjective   Review of Systems: Review of Systems   Constitutional:  Negative for chills, fatigue, fever and unexpected weight loss.   HENT:  Negative for dental problem, mouth sores, sinus pressure and swollen glands.    Eyes:  Negative for photophobia, pain and redness.   Respiratory:  Negative for apnea, cough, chest tightness and shortness of breath.    Cardiovascular:  Negative for chest pain and leg swelling.   Gastrointestinal:  Negative for abdominal pain, diarrhea, nausea and GERD.   Genitourinary:  Negative for dysuria and hematuria.   Musculoskeletal:  Positive for arthralgias and back pain.   Skin:  Negative for dry skin, rash, skin lesions and bruise.   Neurological:  Negative for dizziness, seizures, syncope, weakness, headache and memory problem.   Hematological:  Negative for adenopathy. Does not bruise/bleed easily.  "  Psychiatric/Behavioral:  Negative for sleep disturbance, suicidal ideas, depressed mood and stress. The patient is not nervous/anxious.    All other systems reviewed and are negative.       Medications:   Current Outpatient Medications:     amLODIPine (NORVASC) 5 MG tablet, TAKE 1 TABLET DAILY, Disp: 90 tablet, Rfl: 3    aspirin 81 MG EC tablet, Take 1 tablet by mouth Daily., Disp: , Rfl:     carvedilol (COREG) 6.25 MG tablet, , Disp: , Rfl:     citalopram (CeleXA) 10 MG tablet, TAKE 1 TABLET DAILY, Disp: 90 tablet, Rfl: 3    fluticasone (FLONASE) 50 MCG/ACT nasal spray, 2 sprays into the nostril(s) as directed by provider Daily., Disp: 18.2 mL, Rfl: 11    loratadine (Claritin) 10 MG tablet, Take 1 tablet by mouth As Needed., Disp: , Rfl:     losartan-hydrochlorothiazide (HYZAAR) 100-12.5 MG per tablet, Take 1 tablet by mouth Daily., Disp: , Rfl:     montelukast (SINGULAIR) 10 MG tablet, Take 1 tablet by mouth Daily., Disp: 90 tablet, Rfl: 3    rosuvastatin (CRESTOR) 5 MG tablet, Take 1 tablet by mouth Daily., Disp: 90 tablet, Rfl: 3    Allergies: No Known Allergies    I have reviewed and updated the patient's chief complaint, history of present illness, review of systems, past medical history, surgical history, family history, social history, medications and allergy list as appropriate.     Objective    Vital Signs:   Vitals:    05/29/25 0954   BP: 134/78   Pulse: 67   Temp: 98.1 °F (36.7 °C)   TempSrc: Infrared   Weight: 90 kg (198 lb 8 oz)   Height: 165.1 cm (65\")   PainSc: 3    PainLoc: Hand         Body mass index is 33.03 kg/m².    Physical Exam:  GENERAL: The patient is well developed and well nourished.  Cooperative and oriented times three.  Affect is normal.  Hydration appears normal.    HEENT: Normocephalic and atraumatic.  No notable alopecia.  No facial rash.  Lids and conjunctiva are normal.  Pupils are equal and sclera are clear.  I see no oral or nasal ulcers.  Lips, teeth, and gums are within " normal limits.  Oropharynx is clear. No ropey or tender temporal vessels.  NECK: Neck is supple without adenopathy, masses, or thyromegaly.    LUNGS: Effort is normal.  Lungs are clear without rales or rhonchi.    CARDIOVASCULAR: Normal S1, S2.  No murmurs are heard.   ABDOMEN: Soft and nontender.   EXTREMITIES: There is no edema.    I see no cyanosis or clubbing.    SKIN: Inspection and palpation are normal.  No rashes are present.  No skin lesions.  NEUROLOGIC: Gait is normal.  MUSCULOSKELETAL: Complete joint exam was performed.  There is full range of motion of the shoulders, elbows, wrists, and hands without notable deformities, soft tissue swelling, synovitis, or atrophy. Limited ROM of L shoulder. Shoulder ROM is painful. Osteoarthritic changes are seen in the PIP and DIP joints.  Hips have good flexion and internal and external rotation with pain and stiffness.   Knees have no palpable effusions. Well healed surgical scars are present. There is full extension and full flexion of the knees without pain.  Ankles have no soft tissue swelling, synovitis, or major deformities.    BACK: Straight without notable scoliosis.    Assessment / Plan      Assessment & Plan  Polymyalgia rheumatica syndrome  Symptoms resolved with prednisone.   He has weaned completely and is doing well.   No GCA symptoms.  No visual symptoms, headache, jaw pain, or temporal tenderness.  Pt global is 1/10.    Stay off prednisone for now.   Pain is degenerative given his known OA.   Recheck in 6 months.   Primary osteoarthritis of both hands  RF and CCP negative 11/15  X-rays consistent with OA Hook like osteophytes on 3rd MCP bilaterally..    Stable.  He can use Voltaren gel as needed.  Primary osteoarthritis of left shoulder  Considering surgical options. Injections still help.   Carpal tunnel syndrome of left wrist  S/P release with good results.                      Follow Up:   Return in about 6 months (around 11/29/2025).        Hugo  LAZARA Bahena MD  [facility]

## 2025-05-20 NOTE — ASSESSMENT & PLAN NOTE
Symptoms resolved with prednisone.   He has weaned completely and is doing well.   No GCA symptoms.  No visual symptoms, headache, jaw pain, or temporal tenderness.  Pt global is 1/10.    Stay off prednisone for now.   Pain is degenerative given his known OA.   Recheck in 6 months.

## 2025-05-20 NOTE — ASSESSMENT & PLAN NOTE
RF and CCP negative 11/15  X-rays consistent with OA Hook like osteophytes on 3rd MCP bilaterally..    Stable.  He can use Voltaren gel as needed.

## 2025-05-29 ENCOUNTER — OFFICE VISIT (OUTPATIENT)
Age: 70
End: 2025-05-29
Payer: MEDICARE

## 2025-05-29 VITALS
BODY MASS INDEX: 33.07 KG/M2 | TEMPERATURE: 98.1 F | HEART RATE: 67 BPM | WEIGHT: 198.5 LBS | SYSTOLIC BLOOD PRESSURE: 134 MMHG | HEIGHT: 65 IN | DIASTOLIC BLOOD PRESSURE: 78 MMHG

## 2025-05-29 DIAGNOSIS — G56.02 CARPAL TUNNEL SYNDROME OF LEFT WRIST: ICD-10-CM

## 2025-05-29 DIAGNOSIS — M35.3 POLYMYALGIA RHEUMATICA SYNDROME: Primary | ICD-10-CM

## 2025-05-29 DIAGNOSIS — M19.012 PRIMARY OSTEOARTHRITIS OF LEFT SHOULDER: ICD-10-CM

## 2025-05-29 DIAGNOSIS — M19.041 PRIMARY OSTEOARTHRITIS OF BOTH HANDS: ICD-10-CM

## 2025-05-29 DIAGNOSIS — M19.042 PRIMARY OSTEOARTHRITIS OF BOTH HANDS: ICD-10-CM

## 2025-05-29 RX ORDER — CARVEDILOL 6.25 MG/1
TABLET ORAL
COMMUNITY
Start: 2025-04-19